# Patient Record
Sex: FEMALE | Race: WHITE | NOT HISPANIC OR LATINO | Employment: FULL TIME | URBAN - NONMETROPOLITAN AREA
[De-identification: names, ages, dates, MRNs, and addresses within clinical notes are randomized per-mention and may not be internally consistent; named-entity substitution may affect disease eponyms.]

---

## 2018-06-20 ENCOUNTER — OFFICE VISIT CONVERTED (OUTPATIENT)
Dept: FAMILY MEDICINE CLINIC | Age: 58
End: 2018-06-20
Attending: FAMILY MEDICINE

## 2018-08-22 ENCOUNTER — OFFICE VISIT CONVERTED (OUTPATIENT)
Dept: FAMILY MEDICINE CLINIC | Age: 58
End: 2018-08-22
Attending: FAMILY MEDICINE

## 2018-12-12 ENCOUNTER — OFFICE VISIT CONVERTED (OUTPATIENT)
Dept: OTHER | Facility: HOSPITAL | Age: 58
End: 2018-12-12
Attending: ORTHOPAEDIC SURGERY

## 2018-12-14 ENCOUNTER — OFFICE VISIT CONVERTED (OUTPATIENT)
Dept: FAMILY MEDICINE CLINIC | Age: 58
End: 2018-12-14
Attending: FAMILY MEDICINE

## 2019-03-07 ENCOUNTER — HOSPITAL ENCOUNTER (OUTPATIENT)
Dept: DIABETES SERVICES | Facility: HOSPITAL | Age: 59
Setting detail: RECURRING SERIES
Discharge: HOME | End: 2019-03-31
Attending: FAMILY MEDICINE

## 2019-03-07 ENCOUNTER — HOSPITAL ENCOUNTER (OUTPATIENT)
Dept: OTHER | Facility: HOSPITAL | Age: 59
Discharge: HOME | End: 2019-03-07
Attending: FAMILY MEDICINE

## 2019-03-07 LAB
ALBUMIN SERPL-MCNC: 3.9 G/DL (ref 3.5–5)
ALBUMIN/GLOB SERPL: 1 {RATIO} (ref 1.4–2.6)
ALP SERPL-CCNC: 57 U/L (ref 53–141)
ALT SERPL-CCNC: 23 U/L (ref 10–40)
ANION GAP SERPL CALC-SCNC: 15 MMOL/L (ref 8–19)
AST SERPL-CCNC: 14 U/L (ref 15–50)
BILIRUB SERPL-MCNC: 0.28 MG/DL (ref 0.2–1.3)
BUN SERPL-MCNC: 18 MG/DL (ref 5–25)
BUN/CREAT SERPL: 28 {RATIO} (ref 6–20)
CALCIUM SERPL-MCNC: 9.2 MG/DL (ref 8.7–10.4)
CHLORIDE SERPL-SCNC: 100 MMOL/L (ref 99–111)
CHOLEST SERPL-MCNC: 146 MG/DL (ref 107–200)
CHOLEST/HDLC SERPL: 3.3 {RATIO} (ref 3–6)
CONV CO2: 29 MMOL/L (ref 22–32)
CONV TOTAL PROTEIN: 7.7 G/DL (ref 6.3–8.2)
CREAT UR-MCNC: 0.65 MG/DL (ref 0.5–0.9)
EST. AVERAGE GLUCOSE BLD GHB EST-MCNC: 177 MG/DL
GFR SERPLBLD BASED ON 1.73 SQ M-ARVRAT: >60 ML/MIN/{1.73_M2}
GLOBULIN UR ELPH-MCNC: 3.8 G/DL (ref 2–3.5)
GLUCOSE SERPL-MCNC: 175 MG/DL (ref 65–99)
HBA1C MFR BLD: 7.8 % (ref 3.5–5.7)
HDLC SERPL-MCNC: 44 MG/DL (ref 40–60)
LDLC SERPL CALC-MCNC: 47 MG/DL (ref 70–100)
OSMOLALITY SERPL CALC.SUM OF ELEC: 296 MOSM/KG (ref 273–304)
POTASSIUM SERPL-SCNC: 4.3 MMOL/L (ref 3.5–5.3)
SODIUM SERPL-SCNC: 140 MMOL/L (ref 135–147)
TRIGL SERPL-MCNC: 277 MG/DL (ref 40–150)
VLDLC SERPL-MCNC: 55 MG/DL (ref 5–37)

## 2019-05-09 ENCOUNTER — OFFICE VISIT CONVERTED (OUTPATIENT)
Dept: FAMILY MEDICINE CLINIC | Age: 59
End: 2019-05-09
Attending: FAMILY MEDICINE

## 2019-05-09 ENCOUNTER — HOSPITAL ENCOUNTER (OUTPATIENT)
Dept: OTHER | Facility: HOSPITAL | Age: 59
Discharge: HOME | End: 2019-05-09
Attending: FAMILY MEDICINE

## 2019-05-09 LAB
ALBUMIN SERPL-MCNC: 4.2 G/DL (ref 3.5–5)
ALBUMIN/GLOB SERPL: 1.1 {RATIO} (ref 1.4–2.6)
ALP SERPL-CCNC: 59 U/L (ref 53–141)
ALT SERPL-CCNC: 23 U/L (ref 10–40)
ANION GAP SERPL CALC-SCNC: 17 MMOL/L (ref 8–19)
AST SERPL-CCNC: 18 U/L (ref 15–50)
BILIRUB SERPL-MCNC: 0.21 MG/DL (ref 0.2–1.3)
BUN SERPL-MCNC: 21 MG/DL (ref 5–25)
BUN/CREAT SERPL: 30 {RATIO} (ref 6–20)
CALCIUM SERPL-MCNC: 9.8 MG/DL (ref 8.7–10.4)
CHLORIDE SERPL-SCNC: 94 MMOL/L (ref 99–111)
CHOLEST SERPL-MCNC: 159 MG/DL (ref 107–200)
CHOLEST/HDLC SERPL: 3.5 {RATIO} (ref 3–6)
CONV CO2: 29 MMOL/L (ref 22–32)
CONV TOTAL PROTEIN: 7.9 G/DL (ref 6.3–8.2)
CREAT UR-MCNC: 0.71 MG/DL (ref 0.5–0.9)
EST. AVERAGE GLUCOSE BLD GHB EST-MCNC: 143 MG/DL
GFR SERPLBLD BASED ON 1.73 SQ M-ARVRAT: >60 ML/MIN/{1.73_M2}
GLOBULIN UR ELPH-MCNC: 3.7 G/DL (ref 2–3.5)
GLUCOSE SERPL-MCNC: 97 MG/DL (ref 65–99)
HBA1C MFR BLD: 6.6 % (ref 3.5–5.7)
HDLC SERPL-MCNC: 45 MG/DL (ref 40–60)
LDLC SERPL CALC-MCNC: 45 MG/DL (ref 70–100)
OSMOLALITY SERPL CALC.SUM OF ELEC: 285 MOSM/KG (ref 273–304)
POTASSIUM SERPL-SCNC: 4 MMOL/L (ref 3.5–5.3)
SODIUM SERPL-SCNC: 136 MMOL/L (ref 135–147)
TRIGL SERPL-MCNC: 347 MG/DL (ref 40–150)
VLDLC SERPL-MCNC: 69 MG/DL (ref 5–37)

## 2019-11-04 ENCOUNTER — OFFICE VISIT CONVERTED (OUTPATIENT)
Dept: FAMILY MEDICINE CLINIC | Age: 59
End: 2019-11-04
Attending: FAMILY MEDICINE

## 2019-11-04 ENCOUNTER — HOSPITAL ENCOUNTER (OUTPATIENT)
Dept: OTHER | Facility: HOSPITAL | Age: 59
Discharge: HOME | End: 2019-11-04
Attending: FAMILY MEDICINE

## 2019-11-04 LAB
ALBUMIN SERPL-MCNC: 4.1 G/DL (ref 3.5–5)
ALBUMIN/GLOB SERPL: 1.3 {RATIO} (ref 1.4–2.6)
ALP SERPL-CCNC: 64 U/L (ref 53–141)
ALT SERPL-CCNC: 18 U/L (ref 10–40)
ANION GAP SERPL CALC-SCNC: 18 MMOL/L (ref 8–19)
APPEARANCE UR: ABNORMAL
AST SERPL-CCNC: 18 U/L (ref 15–50)
BACTERIA UR CULT: NORMAL
BACTERIA UR QL AUTO: ABNORMAL
BASOPHILS # BLD MANUAL: 0.04 10*3/UL (ref 0–0.2)
BASOPHILS NFR BLD MANUAL: 0.4 % (ref 0–3)
BILIRUB SERPL-MCNC: 0.24 MG/DL (ref 0.2–1.3)
BILIRUB UR QL: NEGATIVE
BUN SERPL-MCNC: 20 MG/DL (ref 5–25)
BUN/CREAT SERPL: 24 {RATIO} (ref 6–20)
CALCIUM SERPL-MCNC: 9.5 MG/DL (ref 8.7–10.4)
CASTS URNS QL MICRO: ABNORMAL /[LPF]
CHLORIDE SERPL-SCNC: 97 MMOL/L (ref 99–111)
CHOLEST SERPL-MCNC: 137 MG/DL (ref 107–200)
CHOLEST/HDLC SERPL: 3.4 {RATIO} (ref 3–6)
COLOR UR: YELLOW
CONV CO2: 28 MMOL/L (ref 22–32)
CONV LEUKOCYTE ESTERASE: NEGATIVE
CONV TOTAL PROTEIN: 7.3 G/DL (ref 6.3–8.2)
CONV UROBILINOGEN IN URINE BY AUTOMATED TEST STRIP: 0.2 {EHRLICHU}/DL (ref 0.1–1)
CREAT UR-MCNC: 0.84 MG/DL (ref 0.5–0.9)
DEPRECATED RDW RBC AUTO: 41.9 FL
EOSINOPHIL # BLD MANUAL: 0.18 10*3/UL (ref 0–0.7)
EOSINOPHIL NFR BLD MANUAL: 1.6 % (ref 0–7)
EPI CELLS #/AREA URNS HPF: ABNORMAL /[HPF]
ERYTHROCYTE [DISTWIDTH] IN BLOOD BY AUTOMATED COUNT: 13.1 % (ref 11.5–14.5)
EST. AVERAGE GLUCOSE BLD GHB EST-MCNC: 154 MG/DL
GFR SERPLBLD BASED ON 1.73 SQ M-ARVRAT: >60 ML/MIN/{1.73_M2}
GLOBULIN UR ELPH-MCNC: 3.2 G/DL (ref 2–3.5)
GLUCOSE 24H UR-MCNC: NEGATIVE MG/DL
GLUCOSE SERPL-MCNC: 202 MG/DL (ref 65–99)
GRANS (ABSOLUTE): 7.83 10*3/UL (ref 2–8)
GRANS: 70.4 % (ref 30–85)
HBA1C MFR BLD: 14.4 G/DL (ref 12–16)
HBA1C MFR BLD: 7 % (ref 3.5–5.7)
HCT VFR BLD AUTO: 44.5 % (ref 37–47)
HDLC SERPL-MCNC: 40 MG/DL (ref 40–60)
HGB UR QL STRIP: ABNORMAL
IMM GRANULOCYTES # BLD: 0.05 10*3/UL (ref 0–0.54)
IMM GRANULOCYTES NFR BLD: 0.5 % (ref 0–0.43)
KETONES UR QL STRIP: NEGATIVE MG/DL
LDLC SERPL CALC-MCNC: 48 MG/DL (ref 70–100)
LYMPHOCYTES # BLD MANUAL: 2.31 10*3/UL (ref 1–5)
LYMPHOCYTES NFR BLD MANUAL: 6.3 % (ref 3–10)
MCH RBC QN AUTO: 27.6 PG (ref 27–31)
MCHC RBC AUTO-ENTMCNC: 32.4 G/DL (ref 33–37)
MCV RBC AUTO: 85.2 FL (ref 81–99)
MONOCYTES # BLD AUTO: 0.7 10*3/UL (ref 0.2–1.2)
MUCOUS THREADS URNS QL MICRO: ABNORMAL
NITRITE UR-MCNC: NEGATIVE MG/ML
OSMOLALITY SERPL CALC.SUM OF ELEC: 296 MOSM/KG (ref 273–304)
PH UR STRIP.AUTO: 6 [PH] (ref 5–8)
PLATELET # BLD AUTO: 274 10*3/UL (ref 130–400)
PMV BLD AUTO: 8.4 FL (ref 7.4–10.4)
POTASSIUM SERPL-SCNC: 3.8 MMOL/L (ref 3.5–5.3)
PROT UR-MCNC: ABNORMAL MG/DL
RBC # BLD AUTO: 5.22 10*6/UL (ref 4.2–5.4)
RBC # BLD AUTO: ABNORMAL /[HPF]
SODIUM SERPL-SCNC: 139 MMOL/L (ref 135–147)
SP GR UR STRIP: 1.01 (ref 1–1.03)
SPECIMEN SOURCE: ABNORMAL
TRIGL SERPL-MCNC: 246 MG/DL (ref 40–150)
TSH SERPL-ACNC: 2.08 M[IU]/L (ref 0.27–4.2)
UNIDENT CRYS URNS QL MICRO: ABNORMAL /[HPF]
VARIANT LYMPHS NFR BLD MANUAL: 20.8 % (ref 20–45)
VLDLC SERPL-MCNC: 49 MG/DL (ref 5–37)
WBC # BLD AUTO: 11.11 10*3/UL (ref 4.8–10.8)
WBC #/AREA URNS HPF: ABNORMAL /[HPF]

## 2019-11-06 LAB
CONV HEPATITIS C AB WITH REFLEX TO CONFIRMATION: <0.1 S/CO RATIO (ref 0–0.9)
CONV HEPATITIS COMMENT: NORMAL

## 2019-11-07 LAB
AMOXICILLIN+CLAV SUSC ISLT: 4
AMPICILLIN SUSC ISLT: >=32
AMPICILLIN+SULBAC SUSC ISLT: 16
BACTERIA UR CULT: ABNORMAL
CEFAZOLIN SUSC ISLT: <=4
CEFEPIME SUSC ISLT: <=1
CEFTAZIDIME SUSC ISLT: <=1
CEFTRIAXONE SUSC ISLT: <=1
CEFUROXIME ORAL SUSC ISLT: 4
CEFUROXIME PARENTER SUSC ISLT: 4
CIPROFLOXACIN SUSC ISLT: <=0.25
ERTAPENEM SUSC ISLT: <=0.5
GENTAMICIN SUSC ISLT: <=1
LEVOFLOXACIN SUSC ISLT: 0.5
NITROFURANTOIN SUSC ISLT: <=16
TETRACYCLINE SUSC ISLT: >=16
TMP SMX SUSC ISLT: >=320
TOBRAMYCIN SUSC ISLT: <=1

## 2019-12-02 ENCOUNTER — HOSPITAL ENCOUNTER (OUTPATIENT)
Dept: OTHER | Facility: HOSPITAL | Age: 59
Discharge: HOME | End: 2019-12-02
Attending: FAMILY MEDICINE

## 2019-12-02 LAB
ANION GAP SERPL CALC-SCNC: 21 MMOL/L (ref 8–19)
BASOPHILS # BLD MANUAL: 0.04 10*3/UL (ref 0–0.2)
BASOPHILS NFR BLD MANUAL: 0.5 % (ref 0–3)
BUN SERPL-MCNC: 16 MG/DL (ref 5–25)
BUN/CREAT SERPL: 24 {RATIO} (ref 6–20)
CALCIUM SERPL-MCNC: 9.2 MG/DL (ref 8.7–10.4)
CHLORIDE SERPL-SCNC: 100 MMOL/L (ref 99–111)
CONV CO2: 28 MMOL/L (ref 22–32)
CREAT UR-MCNC: 0.66 MG/DL (ref 0.5–0.9)
DEPRECATED RDW RBC AUTO: 41.3 FL
EOSINOPHIL # BLD MANUAL: 0.22 10*3/UL (ref 0–0.7)
EOSINOPHIL NFR BLD MANUAL: 2.6 % (ref 0–7)
ERYTHROCYTE [DISTWIDTH] IN BLOOD BY AUTOMATED COUNT: 13.1 % (ref 11.5–14.5)
GFR SERPLBLD BASED ON 1.73 SQ M-ARVRAT: >60 ML/MIN/{1.73_M2}
GLUCOSE SERPL-MCNC: 120 MG/DL (ref 65–99)
GRANS (ABSOLUTE): 5.54 10*3/UL (ref 2–8)
GRANS: 65.6 % (ref 30–85)
HBA1C MFR BLD: 14.5 G/DL (ref 12–16)
HCT VFR BLD AUTO: 44.4 % (ref 37–47)
IMM GRANULOCYTES # BLD: 0.05 10*3/UL (ref 0–0.54)
IMM GRANULOCYTES NFR BLD: 0.6 % (ref 0–0.43)
LYMPHOCYTES # BLD MANUAL: 2 10*3/UL (ref 1–5)
LYMPHOCYTES NFR BLD MANUAL: 7 % (ref 3–10)
MCH RBC QN AUTO: 27.6 PG (ref 27–31)
MCHC RBC AUTO-ENTMCNC: 32.7 G/DL (ref 33–37)
MCV RBC AUTO: 84.6 FL (ref 81–99)
MONOCYTES # BLD AUTO: 0.59 10*3/UL (ref 0.2–1.2)
OSMOLALITY SERPL CALC.SUM OF ELEC: 300 MOSM/KG (ref 273–304)
PLATELET # BLD AUTO: 216 10*3/UL (ref 130–400)
PMV BLD AUTO: 7.9 FL (ref 7.4–10.4)
POTASSIUM SERPL-SCNC: 4.8 MMOL/L (ref 3.5–5.3)
RBC # BLD AUTO: 5.25 10*6/UL (ref 4.2–5.4)
SODIUM SERPL-SCNC: 144 MMOL/L (ref 135–147)
VARIANT LYMPHS NFR BLD MANUAL: 23.7 % (ref 20–45)
WBC # BLD AUTO: 8.44 10*3/UL (ref 4.8–10.8)

## 2020-02-05 ENCOUNTER — HOSPITAL ENCOUNTER (OUTPATIENT)
Dept: OTHER | Facility: HOSPITAL | Age: 60
Discharge: HOME | End: 2020-02-05
Attending: FAMILY MEDICINE

## 2020-02-05 ENCOUNTER — OFFICE VISIT CONVERTED (OUTPATIENT)
Dept: FAMILY MEDICINE CLINIC | Age: 60
End: 2020-02-05
Attending: FAMILY MEDICINE

## 2020-04-24 ENCOUNTER — OFFICE VISIT CONVERTED (OUTPATIENT)
Dept: FAMILY MEDICINE CLINIC | Age: 60
End: 2020-04-24
Attending: FAMILY MEDICINE

## 2020-10-15 ENCOUNTER — OFFICE VISIT CONVERTED (OUTPATIENT)
Dept: FAMILY MEDICINE CLINIC | Age: 60
End: 2020-10-15
Attending: FAMILY MEDICINE

## 2020-10-15 ENCOUNTER — HOSPITAL ENCOUNTER (OUTPATIENT)
Dept: OTHER | Facility: HOSPITAL | Age: 60
Discharge: HOME | End: 2020-10-15
Attending: FAMILY MEDICINE

## 2020-10-15 LAB
ALBUMIN SERPL-MCNC: 4.4 G/DL (ref 3.5–5)
ALBUMIN/GLOB SERPL: 1.2 {RATIO} (ref 1.4–2.6)
ALP SERPL-CCNC: 60 U/L (ref 53–141)
ALT SERPL-CCNC: 31 U/L (ref 10–40)
ANION GAP SERPL CALC-SCNC: 16 MMOL/L (ref 8–19)
APPEARANCE UR: ABNORMAL
AST SERPL-CCNC: 31 U/L (ref 15–50)
BACTERIA UR CULT: NORMAL
BACTERIA UR QL AUTO: ABNORMAL
BILIRUB SERPL-MCNC: 0.47 MG/DL (ref 0.2–1.3)
BILIRUB UR QL: NEGATIVE
BUN SERPL-MCNC: 22 MG/DL (ref 5–25)
BUN/CREAT SERPL: 31 {RATIO} (ref 6–20)
CALCIUM SERPL-MCNC: 10.5 MG/DL (ref 8.7–10.4)
CASTS URNS QL MICRO: ABNORMAL /[LPF]
CHLORIDE SERPL-SCNC: 97 MMOL/L (ref 99–111)
CHOLEST SERPL-MCNC: 180 MG/DL (ref 107–200)
CHOLEST/HDLC SERPL: 4.1 {RATIO} (ref 3–6)
COLOR UR: YELLOW
CONV CO2: 28 MMOL/L (ref 22–32)
CONV LEUKOCYTE ESTERASE: ABNORMAL
CONV TOTAL PROTEIN: 8.1 G/DL (ref 6.3–8.2)
CONV UROBILINOGEN IN URINE BY AUTOMATED TEST STRIP: 1 {EHRLICHU}/DL (ref 0.1–1)
CREAT UR-MCNC: 0.72 MG/DL (ref 0.5–0.9)
EPI CELLS #/AREA URNS HPF: ABNORMAL /[HPF]
ERYTHROCYTE [DISTWIDTH] IN BLOOD BY AUTOMATED COUNT: 13.3 % (ref 11.7–14.4)
EST. AVERAGE GLUCOSE BLD GHB EST-MCNC: 154 MG/DL
GFR SERPLBLD BASED ON 1.73 SQ M-ARVRAT: >60 ML/MIN/{1.73_M2}
GLOBULIN UR ELPH-MCNC: 3.7 G/DL (ref 2–3.5)
GLUCOSE 24H UR-MCNC: NEGATIVE MG/DL
GLUCOSE SERPL-MCNC: 135 MG/DL (ref 65–99)
HBA1C MFR BLD: 7 % (ref 3.5–5.7)
HCT VFR BLD AUTO: 47.2 % (ref 37–47)
HDLC SERPL-MCNC: 44 MG/DL (ref 40–60)
HGB BLD-MCNC: 15.2 G/DL (ref 12–16)
HGB UR QL STRIP: NEGATIVE
KETONES UR QL STRIP: NEGATIVE MG/DL
LDLC SERPL CALC-MCNC: 93 MG/DL (ref 70–100)
MCH RBC QN AUTO: 28.3 PG (ref 27–31)
MCHC RBC AUTO-ENTMCNC: 32.2 G/DL (ref 33–37)
MCV RBC AUTO: 87.9 FL (ref 81–99)
MUCOUS THREADS URNS QL MICRO: ABNORMAL
NITRITE UR-MCNC: POSITIVE MG/ML
OSMOLALITY SERPL CALC.SUM OF ELEC: 287 MOSM/KG (ref 273–304)
PH UR STRIP.AUTO: 5.5 [PH] (ref 5–8)
PLATELET # BLD AUTO: 263 10*3/UL (ref 130–400)
PMV BLD AUTO: 8.8 FL (ref 9.4–12.3)
POTASSIUM SERPL-SCNC: 4.6 MMOL/L (ref 3.5–5.3)
PROT UR-MCNC: NEGATIVE MG/DL
RBC # BLD AUTO: 5.37 10*6/UL (ref 4.2–5.4)
RBC # BLD AUTO: ABNORMAL /[HPF]
SODIUM SERPL-SCNC: 136 MMOL/L (ref 135–147)
SP GR UR STRIP: 1.02 (ref 1–1.03)
SPECIMEN SOURCE: ABNORMAL
TRIGL SERPL-MCNC: 214 MG/DL (ref 40–150)
TSH SERPL-ACNC: 3.33 M[IU]/L (ref 0.27–4.2)
UNIDENT CRYS URNS QL MICRO: ABNORMAL /[HPF]
VLDLC SERPL-MCNC: 43 MG/DL (ref 5–37)
WBC # BLD AUTO: 8.96 10*3/UL (ref 4.8–10.8)
WBC #/AREA URNS HPF: ABNORMAL /[HPF]

## 2020-10-17 LAB
AMPICILLIN SUSC ISLT: 8
AMPICILLIN+SULBAC SUSC ISLT: 4
BACTERIA UR CULT: ABNORMAL
CEFAZOLIN SUSC ISLT: <=4
CEFEPIME SUSC ISLT: <=0.12
CEFTAZIDIME SUSC ISLT: <=1
CEFTRIAXONE SUSC ISLT: <=0.25
CIPROFLOXACIN SUSC ISLT: <=0.25
ERTAPENEM SUSC ISLT: <=0.12
GENTAMICIN SUSC ISLT: <=1
LEVOFLOXACIN SUSC ISLT: <=0.12
NITROFURANTOIN SUSC ISLT: <=16
PIP+TAZO SUSC ISLT: <=4
TMP SMX SUSC ISLT: <=20
TOBRAMYCIN SUSC ISLT: <=1

## 2020-10-27 ENCOUNTER — OFFICE VISIT (OUTPATIENT)
Dept: ORTHOPEDIC SURGERY | Facility: CLINIC | Age: 60
End: 2020-10-27

## 2020-10-27 VITALS — WEIGHT: 293 LBS | HEIGHT: 67 IN | BODY MASS INDEX: 45.99 KG/M2 | TEMPERATURE: 97.1 F

## 2020-10-27 DIAGNOSIS — G89.29 CHRONIC LEFT SHOULDER PAIN: Primary | ICD-10-CM

## 2020-10-27 DIAGNOSIS — M75.42 IMPINGEMENT SYNDROME OF LEFT SHOULDER: ICD-10-CM

## 2020-10-27 DIAGNOSIS — M25.512 CHRONIC LEFT SHOULDER PAIN: Primary | ICD-10-CM

## 2020-10-27 PROCEDURE — 99204 OFFICE O/P NEW MOD 45 MIN: CPT | Performed by: PHYSICIAN ASSISTANT

## 2020-10-27 PROCEDURE — 20610 DRAIN/INJ JOINT/BURSA W/O US: CPT | Performed by: PHYSICIAN ASSISTANT

## 2020-10-27 RX ORDER — METHYLPREDNISOLONE ACETATE 80 MG/ML
160 INJECTION, SUSPENSION INTRA-ARTICULAR; INTRALESIONAL; INTRAMUSCULAR; SOFT TISSUE
Status: COMPLETED | OUTPATIENT
Start: 2020-10-27 | End: 2020-10-27

## 2020-10-27 RX ADMIN — METHYLPREDNISOLONE ACETATE 160 MG: 80 INJECTION, SUSPENSION INTRA-ARTICULAR; INTRALESIONAL; INTRAMUSCULAR; SOFT TISSUE at 09:11

## 2020-10-27 NOTE — PROGRESS NOTES
NEW VISIT    Patient: Melany Maguire  ?  YOB: 1960    MRN: [id number]  ?  Chief Complaint   Patient presents with   • Left Shoulder - Pain, Establish Care      ?  HPI:   Melany Maguire is a 60 y.o. female who presents for complaint of left shoulder pain.  She reports pain for approximately 10 months and without injury.    Pain Location: LEFT shoulder (entire shoulder, significant tenderness over AC joint)  Radiation: into the lateral aspect of the humerus  Quality: aching, sharp  Intensity/Severity: moderate  Duration: 10 months  Progression of symptoms: yes, progressive worsening  Onset quality: gradual   Timing: constant  Aggravating Factors: reaching forward, reaching crossbody, sleeping on the shoulder  Alleviating Factors: rest  Previous Episodes: none mentioned  Associated Symptoms: pain, decreased strength  ADLs Affected: grooming/hygiene/toileting/personal care, work related activities, recreational activities/sports  Previous Treatment: Tylenol and NSAIDs     This patient is a new patient.  This problem is new to this examiner.      Allergies:   Allergies   Allergen Reactions   • Penicillins Hives       Medications:   Home Medications:  No current outpatient medications on file prior to visit.     No current facility-administered medications on file prior to visit.      Current Medications:  Scheduled Meds:  PRN Meds:.    I have reviewed the patient's medical history in detail and updated the computerized patient record.  Review and summarization of old records include:    Past Medical History:   Diagnosis Date   • Diabetes (CMS/HCC)    • Hypertension    • Neuropathy      Past Surgical History:   Procedure Laterality Date   •  SECTION      x2   • CHOLECYSTECTOMY       Social History     Occupational History   • Not on file   Tobacco Use   • Smoking status: Never Smoker   • Smokeless tobacco: Never Used   Substance and Sexual Activity   • Alcohol use: Never     Frequency: Never  "  • Drug use: Defer   • Sexual activity: Defer      Family History   Problem Relation Age of Onset   • Diabetes Other    • Heart disease Other    • Hypertension Other          Review of Systems  Constitutional: Negative.    HENT: Negative.    Eyes: Negative.    Respiratory: Negative.    Cardiovascular: Negative.    Endocrine: Negative.    Musculoskeletal: Positive for arthralgias, decreased ROM, decreased strength.  Skin: Negative.    Allergic/Immunologic: Negative.    Neurological: Negative    Hematological: Negative.    Psychiatric/Behavioral: Negative.           Wt Readings from Last 3 Encounters:   10/27/20 (!) 137 kg (301 lb)     Ht Readings from Last 3 Encounters:   10/27/20 170.2 cm (67\")     Body mass index is 47.14 kg/m².  Facility age limit for growth percentiles is 20 years.  Vitals:    10/27/20 0840   Temp: 97.1 °F (36.2 °C)         Physical Exam  Constitutional: Patient is oriented to person, place, and time. Appears well-developed and well-nourished.   HENT:   Head: Normocephalic and atraumatic.   Eyes: Conjunctivae and EOM are normal. Pupils are equal, round, and reactive to light.   Cardiovascular: Normal rate and intact distal pulses.   Pulmonary/Chest: Effort normal.   Musculoskeletal:   See detailed exam below   Neurological: Alert and oriented to person, place, and time. No sensory deficit. Coordination normal.   Skin: Skin is warm and dry. Capillary refill takes less than 2 seconds. No rash noted. No erythema.   Psychiatric: Patient has a normal mood and affect. Her behavior is normal. Judgment and thought content normal.   Nursing note and vitals reviewed.      Ortho Exam:   LEFT shoulder:  Positive for pain and tenderness with palpation over the subcromial bursa.   Positive for signs of impingement with internal and external rotation.   Forward flexion is 0- 120 degrees, abduction is 0-110 degrees, external rotation is 0-50 degrees.   Rotator cuff function is fairly well preserved except " impingement at 90 degrees.   Lewis's sign is positive. Neer sign is positive.   Sulcus sign is negative. Drop arm sign is negative.   Apprehension sign is negative.   Axillary nerve function is well preserved. Radial artery pulses are palpable.   There is no evidence of multidirectional instability.   The pain level is 7.      Diagnostics:  Left shoulder xrays 4views were performed at [facility] on 10/15/2020. These images were independently viewed and interpreted by myself, my impression as follows:  · Glenohumeral joint space appears well-preserved, moderate AC joint arthritis with spurring on the inferior aspect of the acromion      Assessment:  Diagnoses and all orders for this visit:    1. Chronic left shoulder pain (Primary)  -     Large Joint Arthrocentesis: R subacromial bursa    2. Impingement syndrome of left shoulder          Large Joint Arthrocentesis: R subacromial bursa  Date/Time: 10/27/2020 9:11 AM  Consent given by: patient  Site marked: site marked  Timeout: Immediately prior to procedure a time out was called to verify the correct patient, procedure, equipment, support staff and site/side marked as required   Supporting Documentation  Indications: pain   Procedure Details  Location: shoulder - R subacromial bursa  Preparation: Patient was prepped and draped in the usual sterile fashion  Needle size: 25 G  Approach: anteromedial  Medications administered: 160 mg methylPREDNISolone acetate 80 MG/ML; 2 mL lidocaine (cardiac)  Patient tolerance: patient tolerated the procedure well with no immediate complications      ?    Plan    Orders Placed This Encounter   Procedures   • Large Joint Arthrocentesis: R subacromial bursa      · Management of chronic condition with acute exacerbation or progression of symptoms   · Discussion of orthopaedic goals and activities.  · Risk, benefits, and merits of treatment alternatives reviewed with the patient.  Discussed conservative measures  with oral anti-inflammatories and intra-articular steroid injection, as well as proceeding with MRI.  Patient is not interested in doing MRI at this point as she is not interested in surgery if at all possible.  We did discuss the possibility of surgery in needed in the future for possible Emily resection and cleaning up spur on the acromion.  If she decided on this we would need to have an MRI first to determine if there is any tearing of the rotator cuff that lies underneath.  We will proceed with intra-articular steroid injection of the left shoulder. Injected patient's left shoulder joint(s) with steroid from a(n) anteromedial approach.  Patient tolerated the procedure well and no complications were encountered.  · Ice, heat, and/or modalities as beneficial  · Watch for signs and symptoms of infection  · Call or notify for any adverse effect from injection therapy  · Patient is encouraged to call or return for any issues or concerns.  · Follow up in 3 months    Eddie Overton PA-C  Date of encounter: 10/27/2020     Electronically signed by Eddie Overton PA-C, 10/27/20, 8:27 AM EDT.    EMR Dragon/Transcription disclaimer:  Much of this encounter note is an electronic transcription/translation of spoken language to printed text. The electronic translation of spoken language may permit erroneous, or at times, nonsensical words or phrases to be inadvertently transcribed; Although I have reviewed the note for such errors, some may still exist.

## 2020-12-04 ENCOUNTER — OFFICE VISIT CONVERTED (OUTPATIENT)
Dept: FAMILY MEDICINE CLINIC | Age: 60
End: 2020-12-04
Attending: FAMILY MEDICINE

## 2020-12-30 ENCOUNTER — OFFICE VISIT CONVERTED (OUTPATIENT)
Dept: FAMILY MEDICINE CLINIC | Age: 60
End: 2020-12-30
Attending: FAMILY MEDICINE

## 2020-12-30 ENCOUNTER — HOSPITAL ENCOUNTER (OUTPATIENT)
Dept: OTHER | Facility: HOSPITAL | Age: 60
Discharge: HOME | End: 2020-12-30
Attending: FAMILY MEDICINE

## 2021-01-01 LAB — SARS-COV-2 RNA SPEC QL NAA+PROBE: NOT DETECTED

## 2021-01-15 ENCOUNTER — HOSPITAL ENCOUNTER (OUTPATIENT)
Dept: OTHER | Facility: HOSPITAL | Age: 61
Discharge: HOME | End: 2021-01-15
Attending: FAMILY MEDICINE

## 2021-01-17 LAB — SARS-COV-2 RNA SPEC QL NAA+PROBE: NOT DETECTED

## 2021-01-26 ENCOUNTER — CLINICAL SUPPORT (OUTPATIENT)
Dept: ORTHOPEDIC SURGERY | Facility: CLINIC | Age: 61
End: 2021-01-26

## 2021-01-26 VITALS — TEMPERATURE: 97.2 F | HEIGHT: 67 IN | WEIGHT: 293 LBS | BODY MASS INDEX: 45.99 KG/M2

## 2021-01-26 DIAGNOSIS — M75.42 IMPINGEMENT SYNDROME OF LEFT SHOULDER: Primary | ICD-10-CM

## 2021-01-26 PROCEDURE — 20610 DRAIN/INJ JOINT/BURSA W/O US: CPT | Performed by: PHYSICIAN ASSISTANT

## 2021-01-26 RX ORDER — DICLOFENAC SODIUM AND MISOPROSTOL 75; 200 MG/1; UG/1
1 TABLET, DELAYED RELEASE ORAL 2 TIMES DAILY
COMMUNITY
End: 2021-08-20

## 2021-01-26 RX ORDER — SODIUM PHOSPHATE,MONO-DIBASIC 19G-7G/118
ENEMA (ML) RECTAL 2 TIMES DAILY
COMMUNITY
End: 2023-01-11

## 2021-01-26 RX ORDER — METHYLPREDNISOLONE ACETATE 80 MG/ML
160 INJECTION, SUSPENSION INTRA-ARTICULAR; INTRALESIONAL; INTRAMUSCULAR; SOFT TISSUE
Status: COMPLETED | OUTPATIENT
Start: 2021-01-26 | End: 2021-01-26

## 2021-01-26 RX ORDER — AMLODIPINE BESYLATE 10 MG/1
TABLET ORAL
COMMUNITY
Start: 2021-01-10 | End: 2021-07-14 | Stop reason: SDUPTHER

## 2021-01-26 RX ORDER — LISINOPRIL AND HYDROCHLOROTHIAZIDE 25; 20 MG/1; MG/1
TABLET ORAL
COMMUNITY
Start: 2021-01-10 | End: 2021-07-14 | Stop reason: SDUPTHER

## 2021-01-26 RX ADMIN — METHYLPREDNISOLONE ACETATE 160 MG: 80 INJECTION, SUSPENSION INTRA-ARTICULAR; INTRALESIONAL; INTRAMUSCULAR; SOFT TISSUE at 09:50

## 2021-01-26 NOTE — PROGRESS NOTES
"Chief Complaint  Follow-up and Pain of the Left Shoulder    Subjective    History of Present Illness      Melany Maguire is a 60 y.o. female who presents to [facility] for is here today for injection therapy. She receives LEFT shoulder injections of steroid. Since last injection, patient notes substantial relief of symptoms.  She reports the injection lasted until about 3 weeks ago.  Treatment options have been discussed and she understands and consents.       Objective   Vital Signs:   Temp 97.2 °F (36.2 °C)   Ht 170.2 cm (67\")   Wt (!) 140 kg (309 lb)   BMI 48.40 kg/m²     Physical Exam  60 y.o. female is awake, alert, oriented, in no acute distress and well developed, well nourished.  Ortho Exam   LEFT shoulder:  Positive for pain and tenderness with palpation over the subcromial bursa.   Positive for signs of impingement with internal and external rotation.   Forward flexion is 0-120 degrees, abduction is 0-110 degrees, external rotation is 0-50 degrees.   Rotator cuff function is fairly well preserved except impingement at 90 degrees.   Lewis's sign is positive. Neer sign is positive.   Sulcus sign is negative. Drop arm sign is negative.   Apprehension sign is negative.   Axillary nerve function is well preserved. Radial artery pulses are palpable.   There is no evidence of multidirectional instability.   The pain level is 4.      PROCEDURE  Large Joint Arthrocentesis: L subacromial bursa  Date/Time: 1/26/2021 9:50 AM  Consent given by: patient  Site marked: site marked  Timeout: Immediately prior to procedure a time out was called to verify the correct patient, procedure, equipment, support staff and site/side marked as required   Supporting Documentation  Indications: pain   Procedure Details  Location: shoulder - L subacromial bursa  Preparation: Patient was prepped and draped in the usual sterile fashion  Needle size: 25 G  Approach: " anteromedial  Medications administered: 160 mg methylPREDNISolone acetate 80 MG/ML; 2 mL lidocaine (cardiac)  Patient tolerance: patient tolerated the procedure well with no immediate complications      Injection site was identified by physical examination and cleaned with Betadine and alcohol swabs. Prior to needle insertion, ethyl chloride spray was used for surface anesthesia. Sterile technique was used.       Assessment   Assessment and Plan    Problem List Items Addressed This Visit        Other    Impingement syndrome of left shoulder - Primary    Relevant Orders    Large Joint Arthrocentesis: L subacromial bursa          Follow Up   • Left shoulder steroid injection was discussed with the patient. Discussed indication, risks, benefits, and alternatives. Verbal consent was given to proceed with the procedure.   • Injection was performed from anteromedial approach.  Patient tolerated the procedure well and no complications were encountered.  • Discussion of orthopedic goals and activities and patient/guardian expressed understanding.  • Ice, heat, rest, compression and elevation of extremity as beneficial  • nsaids and/or tylenol as beneficial  • Instructed to refrain from heavy activity/rest the extremity for the next 24-48 hours  • Discussion regarding possibility of cortisol flare and what to expect if this occurs  • Watch for signs and symptoms of infection  • Call if adverse effect from injection therapy  • Follow up in 3 months  • Patient was given instructions and counseling regarding her condition or for health maintenance advice. Please see specific information pulled into the AVS if appropriate.     Eddie Overton PA-C   Date of Encounter: 1/26/2021   Electronically signed by Eddie Overton PA-C, 01/26/21, 8:37 AM EST.     EMR Dragon/Transcription disclaimer:  Much of this encounter note is an electronic transcription/translation of spoken language to printed text. The electronic  translation of spoken language may permit erroneous, or at times, nonsensical words or phrases to be inadvertently transcribed; Although I have reviewed the note for such errors, some may still exist.

## 2021-04-27 ENCOUNTER — CLINICAL SUPPORT (OUTPATIENT)
Dept: ORTHOPEDIC SURGERY | Facility: CLINIC | Age: 61
End: 2021-04-27

## 2021-04-27 VITALS — TEMPERATURE: 97.3 F | WEIGHT: 293 LBS | HEIGHT: 67 IN | BODY MASS INDEX: 45.99 KG/M2

## 2021-04-27 DIAGNOSIS — M75.42 IMPINGEMENT SYNDROME OF LEFT SHOULDER: Primary | ICD-10-CM

## 2021-04-27 DIAGNOSIS — M25.512 CHRONIC LEFT SHOULDER PAIN: ICD-10-CM

## 2021-04-27 DIAGNOSIS — G89.29 CHRONIC LEFT SHOULDER PAIN: ICD-10-CM

## 2021-04-27 PROCEDURE — 99212 OFFICE O/P EST SF 10 MIN: CPT | Performed by: PHYSICIAN ASSISTANT

## 2021-04-27 PROCEDURE — 20610 DRAIN/INJ JOINT/BURSA W/O US: CPT | Performed by: PHYSICIAN ASSISTANT

## 2021-04-27 RX ORDER — LIDOCAINE HYDROCHLORIDE 10 MG/ML
2 INJECTION, SOLUTION EPIDURAL; INFILTRATION; INTRACAUDAL; PERINEURAL
Status: COMPLETED | OUTPATIENT
Start: 2021-04-27 | End: 2021-04-27

## 2021-04-27 RX ORDER — METHYLPREDNISOLONE ACETATE 80 MG/ML
160 INJECTION, SUSPENSION INTRA-ARTICULAR; INTRALESIONAL; INTRAMUSCULAR; SOFT TISSUE
Status: COMPLETED | OUTPATIENT
Start: 2021-04-27 | End: 2021-04-27

## 2021-04-27 RX ORDER — DICLOFENAC SODIUM 75 MG/1
TABLET, DELAYED RELEASE ORAL
COMMUNITY
Start: 2021-02-24 | End: 2021-04-27 | Stop reason: SDUPTHER

## 2021-04-27 RX ADMIN — LIDOCAINE HYDROCHLORIDE 2 ML: 10 INJECTION, SOLUTION EPIDURAL; INFILTRATION; INTRACAUDAL; PERINEURAL at 09:40

## 2021-04-27 RX ADMIN — METHYLPREDNISOLONE ACETATE 160 MG: 80 INJECTION, SUSPENSION INTRA-ARTICULAR; INTRALESIONAL; INTRAMUSCULAR; SOFT TISSUE at 09:40

## 2021-04-27 NOTE — PROGRESS NOTES
"Chief Complaint  Follow-up and Pain of the Left Shoulder    Subjective    History of Present Illness      Melany Maguire is a 60 y.o. female who presents to [facility] for is here today for injection therapy. She receives LEFT shoulder injections of steroid. Since last injection, patient notes great relief of symptoms but only for approximately 2 weeks.  She states the last injection did not provide as much relief as the first. Treatment options have been discussed and she understands and consents.       Objective   Vital Signs:   Temp 97.3 °F (36.3 °C)   Ht 170.2 cm (67\")   Wt 136 kg (300 lb)   BMI 46.99 kg/m²     Physical Exam  60 y.o. female is awake, alert, oriented, in no acute distress and well developed, well nourished.  Ortho Exam   LEFT shoulder:  Positive for pain and tenderness with palpation over the subcromial bursa.   Positive for signs of impingement with internal and external rotation.   Forward flexion is 0-120 degrees, abduction is 0-110 degrees, external rotation is 0-50 degrees.   Rotator cuff function is fairly well preserved except impingement at 90 degrees.   Lewis's sign is positive. Neer sign is positive.   Sulcus sign is negative. Drop arm sign is negative.   Apprehension sign is negative.   Axillary nerve function is well preserved. Radial artery pulses are palpable.   There is no evidence of multidirectional instability.   The pain level is 6.      PROCEDURE  Large Joint Arthrocentesis: L subacromial bursa  Date/Time: 4/27/2021 9:40 AM  Consent given by: patient  Site marked: site marked  Timeout: Immediately prior to procedure a time out was called to verify the correct patient, procedure, equipment, support staff and site/side marked as required   Supporting Documentation  Indications: pain   Procedure Details  Location: shoulder - L subacromial bursa  Preparation: Patient was prepped and draped in the usual sterile fashion  Needle size: 25 " G  Approach: anteromedial  Medications administered: 2 mL lidocaine PF 1% 1 %; 160 mg methylPREDNISolone acetate 80 MG/ML  Patient tolerance: patient tolerated the procedure well with no immediate complications      Injection site was identified by physical examination and cleaned with Betadine and alcohol swabs. Prior to needle insertion, ethyl chloride spray was used for surface anesthesia. Sterile technique was used.       Assessment   Assessment and Plan    Problem List Items Addressed This Visit        Musculoskeletal and Injuries    Left shoulder pain    Relevant Orders    MRI Shoulder Left Without Contrast    Impingement syndrome of left shoulder - Primary    Relevant Orders    MRI Shoulder Left Without Contrast        I spent 15 minutes caring for Melany on this date of service. This time includes time spent by me in the following activities:performing a medically appropriate examination and/or evaluation , counseling and educating the patient/family/caregiver and ordering medications, tests, or procedures    Follow Up   • Left shoulder steroid injection was discussed with the patient. Discussed indication, risks, benefits, and alternatives. Verbal consent was given to proceed with the procedure.   • Injection was performed from anteromedial approach.  Patient tolerated the procedure well and no complications were encountered.  • Discussion of orthopedic goals and activities and patient/guardian expressed understanding.  • Discussed proceeding with MRI of the shoulder to r/o rotator cuff tear and to assess extent of AC joint arthritis. Once we have these results back we can discuss further treatment options/plan.  • Schedule MRI left shoulder  • Ice, heat, rest, compression and elevation of extremity as beneficial  • nsaids and/or tylenol as beneficial  • Instructed to refrain from heavy activity/rest the extremity for the next 24-48 hours  • Discussion regarding possibility of cortisol flare and what to  expect if this occurs  • Watch for signs and symptoms of infection  • Call if adverse effect from injection therapy  • Follow up in 3 months  • Patient was given instructions and counseling regarding her condition or for health maintenance advice. Please see specific information pulled into the AVS if appropriate.     Eddie Overton PA-C   Date of Encounter: 4/27/2021     Electronically signed by Eddie Overton PA-C, 04/27/21, 12:05 PM EDT.   EMR Dragon/Transcription disclaimer:  Much of this encounter note is an electronic transcription/translation of spoken language to printed text. The electronic translation of spoken language may permit erroneous, or at times, nonsensical words or phrases to be inadvertently transcribed; Although I have reviewed the note for such errors, some may still exist.

## 2021-04-30 ENCOUNTER — TELEPHONE (OUTPATIENT)
Dept: ORTHOPEDIC SURGERY | Facility: CLINIC | Age: 61
End: 2021-04-30

## 2021-04-30 NOTE — TELEPHONE ENCOUNTER
MRI IS SCHEDULED FOR 05/17/21 AT [facility] ARRIVE AT 1:40PM FOR A 2:00PM MRI    JOSHUA WILL CALL WITH RESULTS ON 05/21/21 AROUND 12:45PM (EMAIL SENT TO [facility] TO ADD ON)

## 2021-05-16 VITALS
BODY MASS INDEX: 45.99 KG/M2 | HEART RATE: 87 BPM | RESPIRATION RATE: 16 BRPM | OXYGEN SATURATION: 95 % | WEIGHT: 293 LBS | HEIGHT: 67 IN

## 2021-05-18 NOTE — PROGRESS NOTES
"Melany Maguire  1960     Office/Outpatient Visit    Visit Date: Thu, Oct 15, 2020 08:58 am    Provider: Leighton Finch MD (Assistant: Nettie Cisse MA)    Location: [facility]        Electronically signed by Leighton Finch MD on  10/15/2020 12:53:42 PM                             Subjective:        CC: Mrs. Maguire is a 60 year old White female.  Follow up and discuss kidney function.;         HPI:           Mrs. Maguire presents with type 2 diabetes mellitus without complications.  Specifically, this is type 2, non-insulin requiring diabetes.  Compliance with treatment has been good.  Current meds include an oral hypoglycemic.  Most recent lab results include Hemoglobin A1c:  6.6 (%) (05/09/2019),  7.0 (%) (11/04/2019), Foot Exam (Annual):  11/04/2019 (11/04/2019).  HAS HAD DIABETES EDUCATION           In regard to the essential (primary) hypertension, her current cardiac medication regimen includes a diuretic, an ACE inhibitor, and a calcium channel blocker.  Compliance with treatment has been good.  She did not bring her blood pressure diary, but says that pressures have been well controlled.      ROS:     CONSTITUTIONAL:  Negative for chills and fever.      E/N/T:  Negative for ear pain, nasal congestion and sore throat.      CARDIOVASCULAR:  Positive for pedal edema ( mild ).   Negative for chest pain or palpitations.      RESPIRATORY:  Negative for recent cough and dyspnea.      GASTROINTESTINAL:  Negative for abdominal pain, anorexia, constipation, diarrhea, nausea and vomiting.      GENITOURINARY:  Positive for URINE IS \"CLOUDY\".   Negative for dysuria or hematuria.      MUSCULOSKELETAL:  Positive for arthralgias (LEFT SHOULDER, NO CHANGE WITH NSAIDS).   Negative for myalgias.      NEUROLOGICAL:  Positive for paresthesia ( bilateral lower extremity; NSAIDS HELP ).   Negative for headaches or weakness.          Past Medical History / Family History / Social " History:         Last Reviewed on 10/15/2020 09:26 AM by Leighton Finch    Past Medical History:                 PAST MEDICAL HISTORY             GYNECOLOGICAL HISTORY:     miscarriage 1         CURRENT MEDICAL PROVIDERS:    Neurologist    Obstetrician/Gynecologist         PREVENTIVE HEALTH MAINTENANCE             COLORECTAL CANCER SCREENING: Up to date (colonoscopy q10y; sigmoidoscopy q5y; Cologuard q3y) was last done 2017, Results are in chart; colonoscopy with the following abnormalities noted-- Polyp(s); The next colonoscopy is due  ;;     DENTAL CLEANING: was last done RECOMMENDED 19     EYE EXAM: RECOMMENDED 19     MAMMOGRAM: was last done 2017         Surgical History:         Cholecystectomy    : X 2;     Dilation and Curettage         Family History:         Positive for Congestive Heart Failure ( mother ) and Coronary Artery Disease ( father; sister ).      Positive for Type 2 Diabetes ( mother; sister ).          Social History:     Occupation: [employer]     Marital Status:      Children: 2 children         Tobacco/Alcohol/Supplements:     Last Reviewed on 10/15/2020 09:26 AM by Leighton Finch    Tobacco: She has never smoked.          Alcohol:  Does not drink alcohol and never has.      Caffeine:  She admits to consuming caffeine via tea ( 2 servings per day ) and soda ( 2 servings per day ).          Substance Abuse History:     Last Reviewed on 10/15/2020 09:26 AM by Leighton Finch    None         Mental Health History:     Last Reviewed on 2018 11:03 AM by Elysia Peterson        Communicable Diseases (eg STDs):     Last Reviewed on 2018 11:03 AM by Elysia Peterson        Current Problems:     Last Reviewed on 10/15/2020 09:26 AM by Leighton Finch    Venous insufficiency    History of colonic polyps    Type 2 diabetes mellitus without complications    Essential (primary) hypertension    Other long term  (current) drug therapy    Peripheral neuropathy secondary to uncontrolled type II diabetes        Immunizations:     None        Allergies:     Last Reviewed on 10/15/2020 09:26 AM by Leighton Finch    Penicillins:          Current Medications:     Last Reviewed on 10/15/2020 09:26 AM by Leighton Finch    vitamin E  [daily]    lisinopriL-hydrochlorothiazide 20-25 mg oral tablet [TAKE ONE TABLET BY MOUTH DAILY]    amLODIPine 10 mg oral tablet [1 tablet daily.]    OTC Glusoimaine Take one tablet once daily      OTC Tumeric Take one tablet once daily      OTC Vitamin B12 Take one tablet once daily      metFORMIN 500 mg oral tablet [TAKE ONE TABLET BY MOUTH TWICE A DAY]    Vitamin C     diclofenac sodium 75 mg oral tablet, delayed release (enteric coated) [take 1 tablet (75 mg) by oral route 2 times per day]        Objective:        Vitals:         Current: 10/15/2020 9:05:29 AM    Ht:  5 ft, 7 in;  Wt: 301.8 lbs;  BMI: 47.3T: 97.4 F (temporal);  BP: 152/89 mm Hg (left arm, sitting);  P: 91 bpm (left arm (BP Cuff), sitting);  sCr: 0.66 mg/dL;  GFR: 125.30        Exams:     PHYSICAL EXAM:     GENERAL: vital signs recorded - well developed, well nourished;  no apparent distress;     NECK: trachea is midline; thyroid is non-palpable;     RESPIRATORY: normal respiratory rate and pattern with no distress; normal breath sounds with no rales, rhonchi, wheezes or rubs;     CARDIOVASCULAR: normal rate; rhythm is regular;  no systolic murmur; trace pedal edema;     LYMPHATIC: no enlargement of cervical or facial nodes; no supraclavicular nodes;     MUSCULOSKELETAL: normal gait; normal overall tone TENDER AT LEFT DELTOID INSERTION;     NEUROLOGIC: GROSSLY INTACT     PSYCHIATRIC:  appropriate affect and demeanor; normal speech pattern; grossly normal memory;         Assessment:         E11.9   Type 2 diabetes mellitus without complications       I10   Essential (primary) hypertension       M25.512   Pain in left  shoulder       Z11.59   Encounter for screening for other viral diseases           ORDERS:         Meds Prescribed:       [Refilled] diclofenac sodium 75 mg oral tablet, delayed release (enteric coated) [take 1 tablet (75 mg) by oral route 2 times per day], #180 (one hundred and eighty) tablets, Refills: 1 (one)       [Refilled] lisinopriL-hydrochlorothiazide 20-25 mg oral tablet [TAKE ONE TABLET BY MOUTH DAILY], #90 (ninety) tablets, Refills: 1 (one)       [Refilled] metFORMIN 500 mg oral tablet [TAKE ONE TABLET BY MOUTH TWICE A DAY], #180 (one hundred and eighty) tablets, Refills: 1 (one)       [Refilled] amLODIPine 10 mg oral tablet [1 tablet daily.], #90 (ninety) tablets, Refills: 1 (one)         Radiology/Test Orders:       [id number]  Left Radiologic exam, shoulder; comp, 2 views  (Send-Out)            3017F  Colorectal CA screen results documented and reviewed (PV)  (In-House)              Lab Orders:       [id number]  DIAB1 - [facility] LIPID,CMP, A1C: 74429, 57975, 44415  (Send-Out)            APPTO  Appointment need  (In-House)            42017  BDCB2 - [facility] CBC w/o diff  (Send-Out)            43432  TSH - [facility] TSH  (Send-Out)            84360  BDUAM [facility] Urinalysis, automated, with micro  (Send-Out)            44070  RIBBA - [facility] Hepatitis C antibody with reflex  (Send-Out)              Procedures Ordered:       REFER  Referral to Specialist or Other Facility  (Send-Out)                      Plan:         Type 2 diabetes mellitus without complications    LABORATORY:  Labs ordered to be performed today include Diabetes Panel 1; CMP, Lipid, A1C.      RECOMMENDATIONS given include: need for yearly flu shots.  MIPS Vaccines Flu and Pneumonia updated in Shot record Colorectal Cancer Screening is up to date and the results are in the chart     FOLLOW-UP: Schedule a follow-up visit in 6 months.:.            Prescriptions:       [Refilled] metFORMIN 500 mg oral tablet [TAKE ONE TABLET BY MOUTH TWICE A DAY], #180 (one hundred and  eighty) tablets, Refills: 1 (one)           Orders:       [id number]  DIAB1 - [facility] LIPID,CMP, A1C: 27103, 93130, 30060  (Send-Out)            APPTO  Appointment need  (In-House)            3017F  Colorectal CA screen results documented and reviewed (PV)  (In-House)              Essential (primary) hypertension    LABORATORY:  Labs ordered to be performed today include CBC W/O DIFF, TSH, and urinalysis with micro.            Prescriptions:       [Refilled] lisinopriL-hydrochlorothiazide 20-25 mg oral tablet [TAKE ONE TABLET BY MOUTH DAILY], #90 (ninety) tablets, Refills: 1 (one)       [Refilled] amLODIPine 10 mg oral tablet [1 tablet daily.], #90 (ninety) tablets, Refills: 1 (one)           Orders:       75807  BDCB2 - [facility] CBC w/o diff  (Send-Out)            23116  TSH - [facility] TSH  (Send-Out)            78473  BDUAM - [facility] Urinalysis, automated, with micro  (Send-Out)              Pain in left shoulder        RADIOLOGY:  I have ordered Shoulder x-ray: left shoulder to be done today.      REFERRALS:  Referral initiated to an orthopedist ( Dr. Stalin Bains ).            Orders:       [id number]  Left Radiologic exam, shoulder; comp, 2 views  (Send-Out)            REFER  Referral to Specialist or Other Facility  (Send-Out)              Encounter for screening for other viral diseases    LABORATORY:  Labs ordered to be performed today include Hepatitis C antibody with reflex.            Orders:       12023  RIBBA - [facility] Hepatitis C antibody with reflex  (Send-Out)                  Other Prescriptions:       [Refilled] diclofenac sodium 75 mg oral tablet, delayed release (enteric coated) [take 1 tablet (75 mg) by oral route 2 times per day], #180 (one hundred and eighty) tablets, Refills: 1 (one)         Patient Recommendations:        For  Type 2 diabetes mellitus without complications:    Obtain a flu shot each year.  Schedule a follow-up visit in 6 months.                APPOINTMENT INFORMATION:        Monday Tuesday Wednesday   Thursday Friday Saturday Sunday            Time:___________________AM  PM   Date:_____________________             Charge Capture:         Primary Diagnosis:     E11.9  Type 2 diabetes mellitus without complications           Orders:      30479  Office/outpatient visit; established patient, level 4  (In-House)            APPTO  Appointment need  (In-House)            3017F  Colorectal CA screen results documented and reviewed (PV)  (In-House)              I10  Essential (primary) hypertension     M25.512  Pain in left shoulder     Z11.59  Encounter for screening for other viral diseases

## 2021-05-18 NOTE — PROGRESS NOTES
Melany Maguire 1960     Office/Outpatient Visit    Visit Date:  01:00 pm    Provider: Leighton Finch MD (Assistant: Maria Guadalupe Schreiber MA)    Location: [facility]        Electronically signed by Leighton Finch MD on  2019 05:42:38 PM                             SUBJECTIVE:        CC:     Mrs. Maguire is a 59 year old White female.  dysuria;         HPI:         Annual exam noted.  She cannot recall when she last had a physical exam.    She underwent colonoscopy 2 years ago.   Preventative Health updated today.  Tobacco: She has never smoked.      ROS:     CONSTITUTIONAL:  Negative for chills and fever.      EYES:  Negative for blurred vision and eye drainage.      E/N/T:  Negative for ear pain, nasal congestion and sore throat.      CARDIOVASCULAR:  Positive for pedal edema ( mild ).   Negative for chest pain or palpitations.      RESPIRATORY:  Negative for recent cough and dyspnea.      GASTROINTESTINAL:  Negative for abdominal pain, anorexia, constipation, diarrhea, nausea and vomiting.      GENITOURINARY:  Positive for dysuria.   Negative for hematuria.      MUSCULOSKELETAL:  Negative for myalgias.      NEUROLOGICAL:  Positive for paresthesia ( bilateral lower extremity; GABAPENTIN HELPING ).   Negative for headaches or weakness.          PMH/FMH/SH:     Last Reviewed on 2019 01:51 PM by Leighton Finch    Past Medical History:                 PAST MEDICAL HISTORY             GYNECOLOGICAL HISTORY:     miscarriage 1         CURRENT MEDICAL PROVIDERS:    Obstetrician/Gynecologist         PREVENTIVE HEALTH MAINTENANCE             COLORECTAL CANCER SCREENING: Up to date (colonoscopy q10y; sigmoidoscopy q5y; Cologuard q3y) was last done , Results are in chart; colonoscopy with the following abnormalities noted-- Polyp(s); The next colonoscopy is due  ;;     MAMMOGRAM: was last done          Surgical History:         Cholecystectomy     : X 2;      Dilation and Curettage         Family History:         Positive for Congestive Heart Failure ( mother ) and Coronary Artery Disease ( father; sister ).      Positive for Type 2 Diabetes ( mother; sister ).          Social History:     Occupation: [employer]     Marital Status:      Children: 2 children         Tobacco/Alcohol/Supplements:     Last Reviewed on 2019 01:50 PM by Leighton Finch    Tobacco: She has never smoked.          Alcohol:  Does not drink alcohol and never has.      Caffeine:  She admits to consuming caffeine via tea ( 2 servings per day ) and soda ( 2 servings per day ).          Substance Abuse History:     Last Reviewed on 2019 01:50 PM by Leighton Finch    None         Mental Health History:     Last Reviewed on 2018 11:03 AM by Elysia Peterson        Communicable Diseases (eg STDs):     Last Reviewed on 2018 11:03 AM by Elysia Peterson            Current Problems:     Last Reviewed on 2019 01:55 PM by Leighton Finch    Peripheral neuropathy secondary to uncontrolled type II diabetes     Use of high risk medications     HTN     Venous insufficiency     NIDDM     History of colonic polyps     Elevated triglycerides     Dysuria         Immunizations:     None        Allergies:     Last Reviewed on 2019 01:51 PM by Leighton Finch    Penicillins:        Current Medications:     Last Reviewed on 2019 01:55 PM by Leighton Finch    Metformin HCl 500mg Tablet 1 tab bid     Lisinopril/Hydrochlorothiazide 20mg/25mg Tablet Take 1 tablet(s) by mouth daily     Gabapentin 300mg Capsules Take 1 capsule(s) by mouth bid PRN     Amlodipine  10mg Tablet 1 tablet daily.     Vitamin C     OTC Glusoimaine Take one tablet once daily     OTC Tumeric Take one tablet once daily     OTC Vitamin B12 Take one tablet once daily         OBJECTIVE:        Vitals:         Current: 2019 1:24:19  PM    Ht:  5 ft, 7 in;  Wt: 301.6 lbs;  BMI: 47.2    T: 98.3 F (oral);  BP: 163/65 mm Hg (left arm, sitting);  P: 100 bpm (left arm (BP Cuff), sitting);  sCr: 0.71 mg/dL;  GFR: 117.85        Exams:     PHYSICAL EXAM:     GENERAL: vital signs recorded - well developed, well nourished;  no apparent distress;     EYES: conjunctiva and cornea are normal;     E/N/T:  normal EACs, TMs, nasal/oral mucosa, teeth, gingiva, and oropharynx;     NECK: trachea is midline; thyroid is non-palpable;     RESPIRATORY: normal respiratory rate and pattern with no distress; normal breath sounds with no rales, rhonchi, wheezes or rubs;     CARDIOVASCULAR: normal rate; rhythm is regular;  no systolic murmur; trace pedal edema;     GASTROINTESTINAL: nontender, nondistended; no hepatosplenomegaly or masses; no bruits;     LYMPHATIC: no enlargement of cervical or facial nodes; no supraclavicular nodes;     MUSCULOSKELETAL: normal gait; normal overall tone     NEUROLOGIC: GROSSLY INTACT     PSYCHIATRIC:  appropriate affect and demeanor; normal speech pattern; grossly normal memory;     Left foot exam    Protective sensation using Monofilament test: NORMAL sensation. Patient detects .07 grams of force which is considered normal.    Vascular status: normal peripheral vascular exam with palpable dorsal pedal and posterior tibal pulses and brisk digital capillary refill    Skin is intact without sores or ulcers    Right foot exam    Protective sensation using Monofilament test: NORMAL sensation. Patient detects .07 grams of force which is considered normal.    Vascular status: normal peripheral vascular exam with palpable dorsal pedal and posterior tibal pulses and brisk digital capillary refill    Skin is intact without sores or ulcers         ASSESSMENT           V70.0   Z00.00  Annual exam              DDx:     250.00   E11.9  NIDDM              DDx:     401.1   I10  HTN              DDx:     788.1   R30.0  Dysuria              DDx:     V73.89    Z11.59  Screening test for viral diseases - other              DDx:         ORDERS:         Lab Orders:       [id number]  BDUAM - [facility] Urinalysis, automated, with micro  (Send-Out)         14665  DIAB1 - [facility] LIPID,CMP, A1C: 96086, 21709, 54048  (Send-Out)         42527  BDCBC - [facility] CBC with 3 part diff  (Send-Out)         92008  TSH - [facility] TSH  (Send-Out)         92409  RIBBA - [facility] Hepatitis C antibody with reflex  (Send-Out)           Other Orders:       2028F  Foot examination performed (includes examination through visual inspection, sensory exam with monofi  (In-House)                   PLAN:          Annual exam         COUNSELING provided on: breast self-exam, healthy eating habits, regular exercise, use of seat belts, and ADVISED TO SEE AN EYE DOCTOR AND DENTIST REGULARLY.      FOLLOW-UP: Schedule a follow-up visit in 6 months. AND WITH HER OB/GYN FOR A [employer] MIPS Vaccines Flu and Pneumonia updated in Shot record          NIDDM     LABORATORY:  Labs ordered to be performed today include Diabetes Panel 1; CMP, Lipid, A1C.            Orders:       81955  DIAB1 - [facility] LIPID,CMP, A1C: 98683, 52767, 24672  (Send-Out)         2028F  Foot examination performed (includes examination through visual inspection, sensory exam with monofi  (In-House)            HTN     LABORATORY:  Labs ordered to be performed today include CBC and TSH.            Orders:       [id number]  BDCBC - [facility] CBC with 3 part diff  (Send-Out)         93311  TSH - [facility] TSH  (Send-Out)            Dysuria           Orders:       50812  BDUAM - [facility] Urinalysis, automated, with micro  (Send-Out)            Screening test for viral diseases - other     LABORATORY:  Labs ordered to be performed today include Hepatitis C antibody with reflex.            Orders:       98094  RIBBA - [facility] Hepatitis C antibody with reflex  (Send-Out)               Patient Recommendations:        For  Annual exam:         You should regularly examine your breasts, easily done while in the  shower or with lotion.  Feel and look for differences in consistency from month to month, especially noting knots or lumps, changes in skin appearance, nipple retraction or discharge.    Limit dietary intake of fat (especially saturated fat) and cholesterol.  Eat a variety of foods, including plenty of fruits, vegetables, and grain containg fiber, limit fat intake to 30% of total calories. Balance caloric intake with energy expended.    Maintaining regular physical activity is advised to help prevent heart disease, hypertension, diabetes, and obesity.    Always use shoulder/lap restraints when driving or riding in a vehicle, even those equipped with air bags.  Schedule a follow-up visit in 6 months.              CHARGE CAPTURE           **Please note: ICD descriptions below are intended for billing purposes only and may not represent clinical diagnoses**        Primary Diagnosis:         V70.0 Annual exam            Z00.00    Encounter for general adult medical examination without abnormal findings              Orders:          11572   Preventive medicine, established patient, age 40-64 years  (In-House)           250.00 NIDDM            E11.9    Type 2 diabetes mellitus without complications              Orders:          2028F   Foot examination performed (includes examination through visual inspection, sensory exam with monofi  (In-House)           401.1 HTN            I10    Essential (primary) hypertension    788.1 Dysuria            R30.0    Dysuria    V73.89 Screening test for viral diseases - other            Z11.59    Encounter for screening for other viral diseases

## 2021-05-18 NOTE — PROGRESS NOTES
Melany Maguire 1960     Office/Outpatient Visit    Visit Date: Wed, Aug 22, 2018 09:01 am    Provider: Leighton Finch MD (Assistant: Myrtle Blair MA)    Location: [facility]        Electronically signed by Leighton Finch MD on  08/22/2018 12:55:01 PM                             SUBJECTIVE:        CC:     Ms. Maguire is a 58 year old White female.  This is a follow-up visit.  MED REFILLS; RIGHT ANKLE PAIN;         HPI:         Patient presents with hTN.  Her current cardiac medication regimen includes a diuretic, an ACE inhibitor, and a calcium channel blocker.  She did not bring her blood pressure diary, but says that pressures have been well controlled.  Compliance with treatment has been good.          Ankle pain details; the pain is primarily in the right ankle with pain noted medially.  Swelling is absent.  The pain does not radiate.  She characterizes it as intermittent, moderate in intensity, and sharp.  It began 2 weeks ago.  No precipitating event or injury is identified.      ROS:     CONSTITUTIONAL:  Negative for chills and fever.      E/N/T:  Negative for ear pain, nasal congestion and sore throat.      CARDIOVASCULAR:  Positive for pedal edema ( mild ).   Negative for chest pain or palpitations ( NO MORE PALPITATIONS, NEG W/U LAST YEAR ).      RESPIRATORY:  Negative for recent cough and dyspnea.      GASTROINTESTINAL:  Negative for abdominal pain, nausea and vomiting.      NEUROLOGICAL:  Negative for headaches.      PSYCHIATRIC:  Positive for feelings of stress.          PMH/FMH/SH:     Last Reviewed on 8/22/2018 09:30 AM by Leighton Finch    Past Medical History:             GYNECOLOGICAL HISTORY:    Last mammogram was 2012         CURRENT MEDICAL PROVIDERS:    Obstetrician/Gynecologist         PREVENTIVE HEALTH MAINTENANCE             COLORECTAL CANCER SCREENING:; colonoscopy with the following abnormalities noted-- Polyp(s); The next colonoscopy is due  2027;;;  2017;;     INFLUENZA VACCINE: declines, understands reason for immunization 2016;;         Surgical History:         Cholecystectomy    : X 2;      Dilation and Curettage         Family History:         Positive for Congestive Heart Failure ( mother ) and Coronary Artery Disease ( father; sister ).      Positive for Type 2 Diabetes ( mother; sister ).          Social History:     Occupation: [employer]     Marital Status:      Children: 2 children         Tobacco/Alcohol/Supplements:     Last Reviewed on 2018 09:30 AM by Leighton Finch    Tobacco: She has never smoked.          Alcohol:  Does not drink alcohol and never has.      Caffeine:  She admits to consuming caffeine via tea ( 2 servings per day ) and soda ( 2 servings per day ).          Substance Abuse History:     Last Reviewed on 2018 09:29 AM by Leighton Finch    None         Mental Health History:     Last Reviewed on 2018 09:44 AM by Yareli Turner        Communicable Diseases (eg STDs):     Last Reviewed on 2018 09:44 AM by Yareli Turner            Current Problems:     Last Reviewed on 2018 09:31 AM by Leighton Finch    HTN     Venous insufficiency     NIDDM (diet-controlled)     History of colonic polyps     Elevated triglycerides     Palpitations         Immunizations:     None        Allergies:     Last Reviewed on 2018 09:30 AM by Leighton Finch    Penicillins:        Current Medications:     Last Reviewed on 2018 09:30 AM by Leighton Finch    Amlodipine  10mg Tablet 1 tablet daily.     Lisinopril/Hydrochlorothiazide 20mg/25mg Tablet Take 1 tablet(s) by mouth daily     OTC Glusoimaine Take one tablet once daily     OTC Tumeric Take one tablet once daily     OTC Vitamin B12 Take one tablet once daily         OBJECTIVE:        Vitals:         Current: 2018 9:06:47 AM    Ht:  5 ft, 7 in;  Wt: 308.6 lbs;  BMI: 48.3    T: 97.4 F (oral);   BP: 150/93 mm Hg (left arm, sitting);  P: 83 bpm (left arm (BP Cuff), sitting);  sCr: 0.94 mg/dL;  GFR: 90.96        Exams:     PHYSICAL EXAM:     GENERAL: vital signs recorded - well developed, well nourished;  no apparent distress;     NECK: trachea is midline; thyroid is non-palpable;     RESPIRATORY: normal respiratory rate and pattern with no distress; normal breath sounds with no rales, rhonchi, wheezes or rubs;     CARDIOVASCULAR: normal rate; rhythm is regular;  no systolic murmur; trace pedal edema;     LYMPHATIC: no enlargement of cervical or facial nodes;     NEUROLOGIC: GROSSLY INTACT     PSYCHIATRIC:  appropriate affect and demeanor; normal speech pattern; grossly normal memory; RIGHT ANKLE examination:     Inspection: normal;     Palpation: medial pain;     Neurovascular: normal;     Muscular Strength: normal;     Range of Motion: full active ROM;     Maneuvers: (-) Vicky's sign;             ASSESSMENT           401.1   I10  HTN              DDx:     250.00   E11.9  NIDDM (diet-controlled)              DDx:     719.47   M25.571  Right Ankle pain              DDx:     785.1   R00.2  Palpitations              DDx:         ORDERS:         Meds Prescribed:       Refill of: Amlodipine  10mg Tablet 1 tablet daily.  #30 (Thirty) tablet(s) Refills: 5       Refill of: Lisinopril/Hydrochlorothiazide 20mg/25mg Tablet Take 1 tablet(s) by mouth daily  #30 (Thirty) tablet(s) Refills: 5       Meloxicam 15mg Tablet Take 1 tablet(s) by mouth daily  #15 (Fifteen) tablet(s) Refills: 0         Radiology/Test Orders:       [id number]  Radiologic examination, right ankle complete minimum 3 views  (Send-Out)           Lab Orders:       [id number]  BDCBC - [facility] CBC with 3 part diff  (Send-Out)         [id number]  TSH - [facility] TSH  (Send-Out)         29073  DIAB2 - [facility] CMP A1C LIPID AND MICRO ALBUM CR RATIO: [id number]  (Send-Out)         APPTO  Appointment need  (In-House)                   PLAN:          HTN      LABORATORY:  Labs ordered to be performed today include CBC and TSH.      FOLLOW-UP: Schedule a follow-up visit in 6 months..            Prescriptions:       Refill of: Amlodipine  10mg Tablet 1 tablet daily.  #30 (Thirty) tablet(s) Refills: 5       Refill of: Lisinopril/Hydrochlorothiazide 20mg/25mg Tablet Take 1 tablet(s) by mouth daily  #30 (Thirty) tablet(s) Refills: 5           Orders:       [id number]  BDCBC - [facility] CBC with 3 part diff  (Send-Out)         15857  TSH - [facility] TSH  (Send-Out)         APPTO  Appointment need  (In-House)            NIDDM (diet-controlled)     LABORATORY:  Labs ordered to be performed today include Diabetes Panel 2;CMP, A1C, Lipid, Microalbumin:Creatinine Ratio.            Orders:       [id number]  DIAB2 - [facility] CMP A1C LIPID AND MICRO ALBUM CR RATIO: [id number]  (Send-Out)           Right Ankle pain         RADIOLOGY:  I have ordered a right ankle xray to be done today.      MEDICATIONS: I have prescribed an NSAID.      RECOMMENDATIONS given include: ice therapy, elevation of the foot as much as possible, and WEAR AN ACE WRAP.      CONSIDER PT/PRTHO EVAL           Prescriptions:       Meloxicam 15mg Tablet Take 1 tablet(s) by mouth daily  #15 (Fifteen) tablet(s) Refills: 0           Orders:       [id number]  Radiologic examination, right ankle complete minimum 3 views  (Send-Out)            Palpitations     Observe as improved             Patient Recommendations:        For  HTN:     Schedule a follow-up visit in 6 months.                APPOINTMENT INFORMATION:        Monday Tuesday Wednesday Thursday Friday Saturday Sunday            Time:___________________AM  PM   Date:_____________________         For Right Ankle pain:     right ankle x-ray Use ice over the affected area. Keep the foot elevated as much as possible.              CHARGE CAPTURE           **Please note: ICD descriptions below are intended for billing purposes only and may not represent clinical  diagnoses**        Primary Diagnosis:         401.1 HTN            I10    Essential (primary) hypertension              Orders:          [id number]   Office/outpatient visit; established patient, level 4  (In-House)             APPTO   Appointment need  (In-House)           250.00 NIDDM (diet-controlled)            E11.9    Type 2 diabetes mellitus without complications    719.47 Right Ankle pain            M25.571    Pain in right ankle and joints of right foot    785.1 Palpitations            R00.2    Palpitations        ADDENDUMS:      ____________________________________    Addendum: 08/28/2018 04:08 PM - Alecia Lindsey         Visit Note Faxed to:        [facility]  (Nutritionist); Number [phone]            Addendum: 09/25/2018 10:11 AM - Alecia Lindsey         Visit Note Faxed to:        [facility]  (Nutritionist); Number [phone]            Date: 11/28/2018 11:41 AM    Author: Alecia Lindsey         Visit Note Faxed to:        User Entered Recipient; Number [phone]            Addendum: 11/30/2018 09:22 AM - Alecia Lindsey         Visit Note Faxed to:        User Entered Recipient; Number [phone]

## 2021-05-18 NOTE — PROGRESS NOTES
Melany Maguire 1960     Office/Outpatient Visit    Visit Date: Thu, May 9, 2019 03:32 pm    Provider: Leighton Finch MD (Assistant: Myrtle Blair MA)    Location: [facility]        Electronically signed by Leighton Finch MD on  2019 06:45:22 PM                             SUBJECTIVE:        CC:     Mrs. Maguire is a 58 year old White female.  This is a follow-up visit.  CHECK UP;         HPI:         Patient to be evaluated for nIDDM.  Specifically, this is type 2, non-insulin requiring diabetes.  Compliance with treatment has been good.  Current meds include an oral hypoglycemic.  Most recent lab results include Hemoglobin A1c:  6.5 (%) (2017),  9.0 (%) (2018), Microalbumin, Urine, rand:  60.1 (mg/L) (2018).  HAS HAD DIABETES EDUCATION         In regard to the hTN, her current cardiac medication regimen includes a diuretic, an ACE inhibitor, and a calcium channel blocker.  She did not bring her blood pressure diary, but says that pressures have been well controlled.  Compliance with treatment has been good.      ROS:     CONSTITUTIONAL:  Negative for chills and fever.      CARDIOVASCULAR:  Positive for pedal edema ( mild ).   Negative for chest pain or palpitations.      RESPIRATORY:  Negative for recent cough and dyspnea.      GASTROINTESTINAL:  Negative for abdominal pain, nausea and vomiting.      MUSCULOSKELETAL:  Negative for myalgias.      NEUROLOGICAL:  Positive for paresthesia ( bilateral lower extremity; GABAPENTIN HELPING ).   Negative for headaches.          PMH/FMH/SH:     Last Reviewed on 2019 06:36 PM by Leighton Finch    Past Medical History:                 PAST MEDICAL HISTORY             GYNECOLOGICAL HISTORY:     miscarriage 1         CURRENT MEDICAL PROVIDERS:    Obstetrician/Gynecologist         PREVENTIVE HEALTH MAINTENANCE             COLORECTAL CANCER SCREENING: Up to date (colonoscopy q10y; sigmoidoscopy q5y;  Cologuard q3y) was last done , Results are in chart; colonoscopy with the following abnormalities noted-- Polyp(s); The next colonoscopy is due  ;;     MAMMOGRAM: was last done          Surgical History:         Cholecystectomy    : X 2;      Dilation and Curettage         Family History:         Positive for Congestive Heart Failure ( mother ) and Coronary Artery Disease ( father; sister ).      Positive for Type 2 Diabetes ( mother; sister ).          Social History:     Occupation: [employer]     Marital Status:      Children: 2 children         Tobacco/Alcohol/Supplements:     Last Reviewed on 2019 06:35 PM by Leighton Finch    Tobacco: She has never smoked.          Alcohol:  Does not drink alcohol and never has.      Caffeine:  She admits to consuming caffeine via tea ( 2 servings per day ) and soda ( 2 servings per day ).          Substance Abuse History:     Last Reviewed on 2019 06:35 PM by Leighton Finch    None         Mental Health History:     Last Reviewed on 2018 11:03 AM by Elysia Peterson        Communicable Diseases (eg STDs):     Last Reviewed on 2018 11:03 AM by Elysia Peterson            Current Problems:     Last Reviewed on 2019 06:38 PM by Leighton Finch    Peripheral neuropathy secondary to uncontrolled type II diabetes     Use of high risk medications     HTN     Venous insufficiency     NIDDM     History of colonic polyps     Elevated triglycerides         Immunizations:     None        Allergies:     Last Reviewed on 2019 06:35 PM by Leighton Finch    Penicillins:        Current Medications:     Last Reviewed on 2019 06:37 PM by Leighton Finch    Gabapentin 300mg Capsules Take 1 capsule(s) by mouth bid PRN     Lisinopril/Hydrochlorothiazide 20mg/25mg Tablet Take 1 tablet(s) by mouth daily     Amlodipine  10mg Tablet 1 tablet daily.     Metformin HCl 500mg Tablet 1  tab bid     OTC Glusoimaine Take one tablet once daily     OTC Tumeric Take one tablet once daily     OTC Vitamin B12 Take one tablet once daily     Vitamin C         OBJECTIVE:        Vitals:         Current: 5/9/2019 3:38:00 PM    Ht:  5 ft, 7 in;  Wt: 302.4 lbs;  BMI: 47.4    T: 98.2 F (oral);  BP: 134/77 mm Hg (left arm, sitting);  P: 88 bpm (left arm (BP Cuff), sitting);  sCr: 0.65 mg/dL;  GFR: 130.42        Exams:     PHYSICAL EXAM:     GENERAL: vital signs recorded - well developed, well nourished;  no apparent distress;     NECK: trachea is midline; thyroid is non-palpable;     RESPIRATORY: normal respiratory rate and pattern with no distress; normal breath sounds with no rales, rhonchi, wheezes or rubs;     CARDIOVASCULAR: normal rate; rhythm is regular;  no systolic murmur; trace pedal edema;     LYMPHATIC: no enlargement of cervical or facial nodes;     NEUROLOGIC: GROSSLY INTACT     PSYCHIATRIC:  appropriate affect and demeanor; normal speech pattern; grossly normal memory;         Lab/Test Results:             Urine temperature:  confirmed (05/09/2019),     All urine drug screen levels confirmed negative:  yes (05/09/2019),     Date and time of last pill:  gabapentin 5-9-19 at 0800/kh (05/09/2019),     Performed by:  bubba (05/09/2019),     Collection Time:  1630 (05/09/2019),             ASSESSMENT           250.00   E11.9  NIDDM              DDx:     401.1   I10  HTN              DDx:     250.62   E11.42  Peripheral neuropathy secondary to uncontrolled type II diabetes              DDx:     V58.69   Z79.899  Use of high risk medications              DDx:         ORDERS:         Lab Orders:       [id number]  DIAB1 - H LIPID,CMP, A1C: 29732, 29967, 21838  (Send-Out)         APPTO  Appointment need  (In-House)         43126  Drug test prsmv qual dir optical obs per day  (In-House)                   PLAN:          NIDDM     LABORATORY:  Labs ordered to be performed today include Diabetes Panel 1; CMP, Lipid,  A1C.      FOLLOW-UP: Schedule a follow-up visit in 4 months..            Orders:       [id number]  DIAB[address] [place] [address] LIPID,CMP, A1C: 78602, 66733, 59398  (Send-Out)         APPTO  Appointment need  (In-House)            HTN     AS ABOVE          Peripheral neuropathy secondary to uncontrolled type II diabetes     Controlled substance documentation: Alexis reviewed; drug screen performed and appropriate; consent is reviewed and signed and on the chart.  She is aware of risk of addiction on this medication, understands that she will need to follow up for a review every 3 months and her medications will be adjusted or decreased as deemed appropriate at each visit.  No history of drug or alcohol abuse.  No concerns about diversion or abuse. She denies side effects related to the medication.  She is aware that she may be called in for pill counts.  The dosing of this medication will be reviewed on a regular basis and reduced if possible..  Ongoing use of a controlled substance is necessary for this patient to have a normal quality of life          Use of high risk medications     LABORATORY:  Labs ordered to be performed today include Drug screen.            Orders:       [id number]  Drug test prsmv qual dir optical obs per day  (In-House)               Patient Recommendations:        For  NIDDM:     Schedule a follow-up visit in 4 months.                APPOINTMENT INFORMATION:        Monday Tuesday Wednesday Thursday Friday Saturday Sunday            Time:___________________AM  PM   Date:_____________________             CHARGE CAPTURE           **Please note: ICD descriptions below are intended for billing purposes only and may not represent clinical diagnoses**        Primary Diagnosis:         250.00 NIDDM            E11.9    Type 2 diabetes mellitus without complications              Orders:          [id number]   Office/outpatient visit; established patient, level 4  (In-House)             APPTO   Appointment need  (In-House)            401.1 HTN            I10    Essential (primary) hypertension    250.62 Peripheral neuropathy secondary to uncontrolled type II diabetes            E11.42    Type 2 diabetes mellitus with diabetic polyneuropathy    V58.69 Use of high risk medications            Z79.899    Other long term (current) drug therapy              Orders:          [id number]   Drug test prsmv qual dir optical obs per day  (In-House)

## 2021-05-18 NOTE — PROGRESS NOTES
Melany Maguire 1960     Office/Outpatient Visit    Visit Date: Fri, Dec 14, 2018 11:03 am    Provider: Leighton Finch MD (Assistant: Elysia Peterson MA)    Location: [facility]        Electronically signed by Leighton Finch MD on  12/14/2018 02:31:08 PM                             SUBJECTIVE:        CC:     Ms. Maguire is a 58 year old White female.  Patient is here for 3 month check up and possible lab work.;         HPI:         Patient to be evaluated for nIDDM.  Specifically, this is type 2, non-insulin requiring diabetes.  Compliance with treatment has been good.  Current meds include an oral hypoglycemic.  She does not perform home blood glucose monitoring.  Most recent lab results include Hemoglobin A1c:  6.5 (%) (08/11/2017),  9.0 (%) (08/22/2018), Microalbumin, Urine, rand:  60.1 (mg/L) (08/22/2018).  In regard to preventative care, her last ophthalmology exam was in ADVISED TO DO SO.  DID NOT SEE THE DM EDUCATOR         HTN details; her current cardiac medication regimen includes a diuretic, an ACE inhibitor, and a calcium channel blocker.  She did not bring her blood pressure diary, but says that pressures have been well controlled.  Compliance with treatment has been good.      ROS:     CONSTITUTIONAL:  Negative for chills and fever.      E/N/T:  Negative for ear pain, nasal congestion and sore throat.      CARDIOVASCULAR:  Positive for pedal edema ( mild ).   Negative for chest pain or palpitations ( NO MORE PALPITATIONS, NEG W/U LAST YEAR ).      RESPIRATORY:  Negative for recent cough and dyspnea.      GASTROINTESTINAL:  Negative for abdominal pain, nausea and vomiting.      NEUROLOGICAL:  Negative for headaches.      PSYCHIATRIC:  Positive for feelings of stress.          PMH/FMH/SH:     Last Reviewed on 12/14/2018 11:46 AM by Leighton Finch    Past Medical History:             GYNECOLOGICAL HISTORY:    Last mammogram was 2012         CURRENT MEDICAL  PROVIDERS:    Obstetrician/Gynecologist         PREVENTIVE HEALTH MAINTENANCE             COLORECTAL CANCER SCREENING: Up to date (colonoscopy q10y; sigmoidoscopy q5y; Cologuard q3y) was last done , Results are in chart; colonoscopy with the following abnormalities noted-- Polyp(s); The next colonoscopy is due  ;;         Surgical History:         Cholecystectomy    : X 2;      Dilation and Curettage         Family History:         Positive for Congestive Heart Failure ( mother ) and Coronary Artery Disease ( father; sister ).      Positive for Type 2 Diabetes ( mother; sister ).          Social History:     Occupation: [employer]     Marital Status:      Children: 2 children         Tobacco/Alcohol/Supplements:     Last Reviewed on 2018 11:43 AM by Leighton Finch    Tobacco: She has never smoked.          Alcohol:  Does not drink alcohol and never has.      Caffeine:  She admits to consuming caffeine via tea ( 2 servings per day ) and soda ( 2 servings per day ).          Substance Abuse History:     Last Reviewed on 2018 11:43 AM by Leighton Finch    None         Mental Health History:     Last Reviewed on 2018 11:03 AM by Elysia Peterson        Communicable Diseases (eg STDs):     Last Reviewed on 2018 11:03 AM by Elysai Peterson            Current Problems:     Last Reviewed on 2018 11:47 AM by Leighton Finch    HTN     Venous insufficiency     NIDDM     History of colonic polyps     Elevated triglycerides     Ankle pain         Immunizations:     None        Allergies:     Last Reviewed on 2018 11:43 AM by Leighton Finch    Penicillins:        Current Medications:     Last Reviewed on 2018 11:47 AM by Leighton Finch    Metformin HCl 500mg Tablet One PO QD with largest meal.     Amlodipine  10mg Tablet 1 tablet daily.     Lisinopril/Hydrochlorothiazide 20mg/25mg Tablet Take 1 tablet(s) by  mouth daily     Meloxicam 15mg Tablet Take 1 tablet(s) by mouth daily     OTC Glusoimaine Take one tablet once daily     OTC Tumeric Take one tablet once daily     OTC Vitamin B12 Take one tablet once daily         OBJECTIVE:        Vitals:         Current: 12/14/2018 11:07:00 AM    Ht:  5 ft, 7 in;  Wt: 303.8 lbs;  BMI: 47.6    T: 98.2 F (oral);  BP: 152/87 mm Hg (left arm, sitting);  P: 79 bpm (left arm (BP Cuff), sitting);  sCr: 0.72 mg/dL;  GFR: 117.97        Exams:     PHYSICAL EXAM:     GENERAL: vital signs recorded - well developed, well nourished;  no apparent distress;     NECK: trachea is midline; thyroid is non-palpable;     RESPIRATORY: normal respiratory rate and pattern with no distress; normal breath sounds with no rales, rhonchi, wheezes or rubs;     CARDIOVASCULAR: normal rate; rhythm is regular;  no systolic murmur; trace pedal edema;     LYMPHATIC: no enlargement of cervical or facial nodes;     NEUROLOGIC: GROSSLY INTACT     PSYCHIATRIC:  appropriate affect and demeanor; normal speech pattern; grossly normal memory;         ASSESSMENT           250.00   E11.9  NIDDM              DDx:     401.1   I10  HTN              DDx:         ORDERS:         Meds Prescribed:       Refill of: Meloxicam 15mg Tablet Take 1 tablet(s) by mouth daily  #30 (Thirty) tablet(s) Refills: 2         Lab Orders:       [id number]  DIAB2 - [facility] CMP A1C LIPID AND MICRO ALBUM CR RATIO: [id number]  (Send-Out)         APPTO  Appointment need  (In-House)                   PLAN:          NIDDM     LABORATORY:  Labs ordered to be performed today include Diabetes Panel 2;CMP, A1C, Lipid, Microalbumin:Creatinine Ratio.      FOLLOW-UP: Schedule a follow-up visit in 4 months..      WE WILL RESCHEDULE THE DM EDUCATION APPT           Orders:       [id number]  DIAB2 - [facility] CMP A1C LIPID AND MICRO ALBUM CR RATIO: [id number]  (Send-Out)         APPTO  Appointment need  (In-House)            HTN         MEDICATIONS:  (no change to current medication regimen)             Other Prescriptions:       Refill of: Meloxicam 15mg Tablet Take 1 tablet(s) by mouth daily  #30 (Thirty) tablet(s) Refills: 2         Patient Recommendations:        For  NIDDM:     Schedule a follow-up visit in 4 months.                APPOINTMENT INFORMATION:        Monday Tuesday Wednesday Thursday Friday Saturday Sunday            Time:___________________AM  PM   Date:_____________________             CHARGE CAPTURE           **Please note: ICD descriptions below are intended for billing purposes only and may not represent clinical diagnoses**        Primary Diagnosis:         250.00 NIDDM            E11.9    Type 2 diabetes mellitus without complications              Orders:          [id number]   Office/outpatient visit; established patient, level 4  (In-House)             APPTO   Appointment need  (In-House)           401.1 HTN            I10    Essential (primary) hypertension        ADDENDUMS:      ____________________________________    Addendum: 12/14/2018 02:36 PM - Ctarachita Ryder         Visit Note Faxed to:        User Entered Recipient; Number [phone]

## 2021-05-18 NOTE — PROGRESS NOTES
Melany Maguire  1960     Office/Outpatient Visit    Visit Date: Wed, Dec 30, 2020 01:56 pm    Provider: Vikas Maldonado MD (Assistant: Jaclyn Martins, )    Location: [facility]        Electronically signed by Vikas Maldonado MD on  12/30/2020 02:28:52 PM                             Subjective:        CC: Mrs. Maguire is a 60 year old White female.  cough, congestion, headache, took a rapid test this morning and was negative;         HPI:           Patient to be evaluated for acute upper respiratory infection, unspecified.  These have been present for the past 3 days.  The symptoms include chest congestion, cough, headache, nasal congestion, nasal discharge, sinus pain/pressure, wheezing and dyspnea.  She denies body aches, Chills or fever.  She denies exposure to ill contacts.  She has already tried to relieve the symptoms with Karen Moreno.  Medical history is significant for diabetes.      ROS:     CONSTITUTIONAL:  Negative for chills, fatigue, fever, and weight change.      E/N/T:  Positive for nasal congestion, frequent rhinorrhea and sinus pressure.   Negative for ear pain, tinnitus or sore throat.      CARDIOVASCULAR:  Negative for chest pain, dizziness, palpitations and edema.      RESPIRATORY:  Positive for dyspnea, frequent wheezing, cough and chest congestion.      GASTROINTESTINAL:  Positive for heartburn.   Negative for abdominal pain, diarrhea, nausea or vomiting.      GENITOURINARY:  Negative for dysuria, hematuria and polyuria.      MUSCULOSKELETAL:  Negative for arthralgias and myalgias.      INTEGUMENTARY/BREAST:  Negative for rash, breast mass and skin changes of breast.      NEUROLOGICAL:  Positive for headaches.   Negative for paresthesias or weakness.          Past Medical History / Family History / Social History:         Last Reviewed on 12/30/2020 02:28 PM by Vikas Maldonado    Past Medical History:                 PAST MEDICAL HISTORY             GYNECOLOGICAL  HISTORY:     miscarriage 1         CURRENT MEDICAL PROVIDERS:    Neurologist    Obstetrician/Gynecologist         PREVENTIVE HEALTH MAINTENANCE             COLORECTAL CANCER SCREENING: Up to date (colonoscopy q10y; sigmoidoscopy q5y; Cologuard q3y) was last done 2017, Results are in chart; colonoscopy with the following abnormalities noted-- Polyp(s); The next colonoscopy is due  ;;     DENTAL CLEANING: was last done RECOMMENDED 10/15/20     EYE EXAM: RECOMMENDED 10/15/20     MAMMOGRAM: was last done 2019 REPORT NOT IN CHART     PAP SMEAR: was last done 2019 with normal results         Surgical History:         Cholecystectomy    : X 2;     Dilation and Curettage         Family History:         Positive for Congestive Heart Failure ( mother ) and Coronary Artery Disease ( father; sister ).      Positive for Type 2 Diabetes ( mother; sister ).          Social History:     Occupation: [employer]     Marital Status:      Children: 2 children         Tobacco/Alcohol/Supplements:     Last Reviewed on 2020 02:28 PM by Vikas Maldonado    Tobacco: She has never smoked.          Alcohol:  Does not drink alcohol and never has.      Caffeine:  She admits to consuming caffeine via tea ( 2 servings per day ) and soda ( 2 servings per day ).          Substance Abuse History:     Last Reviewed on 2020 02:28 PM by Vikas Maldonado    None         Mental Health History:     Last Reviewed on 2020 02:28 PM by Vikas Maldonado        Communicable Diseases (eg STDs):     Last Reviewed on 2020 02:28 PM by Vikas Maldonado        Current Problems:     Last Reviewed on 2020 02:28 PM by Vikas Maldonado    Venous insufficiency    History of colonic polyps    Type 2 diabetes mellitus without complications    Essential (primary) hypertension    Peripheral neuropathy secondary to uncontrolled type II diabetes    Shortness of breath    Unspecified acute conjunctivitis, bilateral    Acute  "upper respiratory infection, unspecified        Immunizations:     None        Allergies:     Last Reviewed on 12/30/2020 02:28 PM by Vikas Maldonado    Penicillins:          Current Medications:     Last Reviewed on 12/30/2020 02:28 PM by Vikas Maldonado    vitamin E  [daily]    lisinopriL-hydrochlorothiazide 20-25 mg oral tablet [TAKE ONE TABLET BY MOUTH DAILY]    amLODIPine 10 mg oral tablet [1 tablet daily.]    OTC Glusoimaine Take one tablet once daily      OTC Tumeric Take one tablet once daily      OTC Vitamin B12 Take one tablet once daily      metFORMIN 500 mg oral tablet [TAKE ONE TABLET BY MOUTH TWICE A DAY]    Vitamin C     diclofenac sodium 75 mg oral tablet, delayed release (enteric coated) [take 1 tablet (75 mg) by oral route 2 times per day]    Polytrim 10,000 unit- 1 mg/mL ophthalmic (eye) Drops [2 DROPS IN BOTH EYES TID FOR FIVE DAYS]    albuterol sulfate 90 mcg/actuation Inhalation HFA Aerosol Inhaler [2 PUFFS QID PRN]        Objective:        Vitals:         Current: 12/30/2020 2:02:26 PM    Ht:  5 ft, 7 in;  Wt: 304.4 lbs;  BMI: 47.7T: 98.3 F (temporal);  BP: 147/96 mm Hg (left arm, sitting);  P: 99 bpm (left arm (BP Cuff), sitting);  sCr: 0.72 mg/dL;  GFR: 115.27O2 Sat: 95 % (room air)        Exams:     PHYSICAL EXAM:     GENERAL: vital signs recorded - well developed, well nourished;  no apparent distress;     EYES: conjunctiva and cornea are normal;     E/N/T: EARS: both TMs are have fluid behind them;  NOSE: nasal mucosa is partially obscured by clear drainage and erythematous;  no sinus tenderness; OROPHARYNX: posterior pharynx shows no exudate and erythema;     RESPIRATORY: no rales (\"crackles\") present; no rhonchi; diffuse expiratory wheezes;     CARDIOVASCULAR: normal rate; rhythm is regular;  No murmurs. clicks, gallops or rubs appreciated; no edema;     LYMPHATIC: no enlargement of cervical or facial nodes; no supraclavicular nodes;     BREAST/INTEGUMENT: No significant rashes, lesions or " suspicious moles within limits of examination;     NEUROLOGIC: Grossly intact; mental status: alert and oriented x 3;     PSYCHIATRIC: appropriate affect and demeanor; normal speech pattern; Normal behavior;         Assessment:         J06.9   Acute upper respiratory infection, unspecified           ORDERS:         Meds Prescribed:       [New Rx] predniSONE 50 mg oral tablet [take 1 once daily], #5 (five) tablets, Refills: 0 (zero)       [New Rx] promethazine-DM 6.25-15 mg/5 mL oral Syrup [take 5 milliliters by oral route every 4 hours as needed, not to exceed 30 mL in 24 hours], #118 (one hundred and eighteen) milliliters, Refills: 0 (zero)         Lab Orders:       [id number]  COVID 19 Testing  (Send-Out)                      Plan:         Acute upper respiratory infection, unspecified- Clinical picture appears to be viral/arthritis.  Will cover with prednisone 50 mg daily x5 days and Promethazine DM for cough suppression/decongestion.  Delayed antibiotic prescription provided with cefdinir to be used if symptoms extend past 7 to 10 days duration or if overt bacterial signs develop.  COVID testing performed today and pending. .  Infectious precautions given including importance of social distancing, good hygiene, behaviors for visitors/family, safe grocery shopping practices, etc. Call/RTC or present to ED for worsening symptoms such as fever not responding to tylenol and respiratory distress/shortness of breath    LABORATORY:  Labs ordered to be performed today include COVID 19 Testing.            Prescriptions:       [New Rx] predniSONE 50 mg oral tablet [take 1 once daily], #5 (five) tablets, Refills: 0 (zero)       [New Rx] promethazine-DM 6.25-15 mg/5 mL oral Syrup [take 5 milliliters by oral route every 4 hours as needed, not to exceed 30 mL in 24 hours], #118 (one hundred and eighteen) milliliters, Refills: 0 (zero)           Orders:       [id number]  COVID 19 Testing  (Send-Out)                  Charge Capture:          Primary Diagnosis:     J06.9  Acute upper respiratory infection, unspecified           Orders:      [id number]  Office/outpatient visit; established patient, level 3  (In-House)

## 2021-05-18 NOTE — PROGRESS NOTES
Melany Maguire  1960     Office/Outpatient Visit    Visit Date: Fri, Dec 4, 2020 01:37 pm    Provider: Leighton Finch MD (Assistant: Loan Doan MA)    Location: [facility]        Electronically signed by Leighton Finch MD on  2020 05:53:11 PM                             Subjective:        CC: Mrs. Maguire is a 60 year old White female.  She presents with eye redness, SOA h/a since last night, denies cough, muscles aches, fever.          HPI:           Patient complains of unspecified acute conjunctivitis, bilateral.  Mrs. Maguire notes bilateral eye irritation.  Patient denies any eye trauma.  Onset of the described symptoms was yesterday.  Pertinent medical history of allergies.  The patient has NOT been exposed to others with pink eye.      ROS:     CONSTITUTIONAL:  Negative for chills and fever.      EYES:  Negative for blurred vision.      E/N/T:  Negative for ear pain, nasal congestion and sore throat.      RESPIRATORY:  Positive for dyspnea ( MILD FOR THE PAST DAY, HAS A HISTORY OF MILD ASTHMA AND HAS USED AN INHALER IN THE PAST ).   Negative for recent cough.      GASTROINTESTINAL:  Negative for abdominal pain, nausea and vomiting.      MUSCULOSKELETAL:  Negative for arthralgias and myalgias.      NEUROLOGICAL:  Negative for headaches.          Past Medical History / Family History / Social History:         Last Reviewed on 2020 01:53 PM by Leighton Finch    Past Medical History:                 PAST MEDICAL HISTORY             GYNECOLOGICAL HISTORY:     miscarriage 1         CURRENT MEDICAL PROVIDERS:    Neurologist    Obstetrician/Gynecologist         PREVENTIVE HEALTH MAINTENANCE             COLORECTAL CANCER SCREENING: Up to date (colonoscopy q10y; sigmoidoscopy q5y; Cologuard q3y) was last done , Results are in chart; colonoscopy with the following abnormalities noted-- Polyp(s); The next colonoscopy is due  ;;      DENTAL CLEANING: was last done RECOMMENDED 10/15/20     EYE EXAM: RECOMMENDED 10/15/20     MAMMOGRAM: was last done 2019 REPORT NOT IN CHART     PAP SMEAR: was last done 2019 with normal results         Surgical History:         Cholecystectomy    : X 2;     Dilation and Curettage         Family History:         Positive for Congestive Heart Failure ( mother ) and Coronary Artery Disease ( father; sister ).      Positive for Type 2 Diabetes ( mother; sister ).          Social History:     Occupation: [employer]     Marital Status:      Children: 2 children         Tobacco/Alcohol/Supplements:     Last Reviewed on 2020 01:53 PM by Leighton Finch    Tobacco: She has never smoked.          Alcohol:  Does not drink alcohol and never has.      Caffeine:  She admits to consuming caffeine via tea ( 2 servings per day ) and soda ( 2 servings per day ).          Substance Abuse History:     Last Reviewed on 2020 01:53 PM by Leighton Finch    None         Mental Health History:     Last Reviewed on 2018 11:03 AM by Elysia Peterson        Communicable Diseases (eg STDs):     Last Reviewed on 2018 11:03 AM by Elysia Peterson        Current Problems:     Last Reviewed on 2020 01:53 PM by Leighton Finch    Venous insufficiency    History of colonic polyps    Type 2 diabetes mellitus without complications    Essential (primary) hypertension    Peripheral neuropathy secondary to uncontrolled type II diabetes    Shortness of breath        Immunizations:     None        Allergies:     Last Reviewed on 2020 01:53 PM by Leighton Finch    Penicillins:          Current Medications:     Last Reviewed on 2020 01:53 PM by Leighton Finch    vitamin E  [daily]    lisinopriL-hydrochlorothiazide 20-25 mg oral tablet [TAKE ONE TABLET BY MOUTH DAILY]    amLODIPine 10 mg oral tablet [1 tablet daily.]    OTC Glusoimaine Take one  tablet once daily      OTC Tumeric Take one tablet once daily      OTC Vitamin B12 Take one tablet once daily      metFORMIN 500 mg oral tablet [TAKE ONE TABLET BY MOUTH TWICE A DAY]    Vitamin C     diclofenac sodium 75 mg oral tablet, delayed release (enteric coated) [take 1 tablet (75 mg) by oral route 2 times per day]        Objective:        Vitals:         Current: 12/4/2020 1:47:40 PM    Ht:  5 ft, 7 in;  Wt: 305.6 lbs;  BMI: 47.9T: 96.3 F (temporal);  BP: 133/74 mm Hg (right arm, sitting);  P: 87 bpm (right arm (BP Cuff), sitting);  sCr: 0.72 mg/dL;  GFR: 115.47O2 Sat: 94 % (room air)        Exams:     PHYSICAL EXAM:     GENERAL: vital signs recorded - well developed, well nourished;  no apparent distress;     EYES: lids and lacrimal system are normal in appearance; extraocular movements intact; hyperemic bilateral conjunctiva;  PERRL;     E/N/T:  normal EACs, TMs, nasal/oral mucosa, teeth, gingiva, and oropharynx;     NECK: trachea is midline; thyroid is non-palpable;     RESPIRATORY: normal respiratory rate and pattern with no distress; normal breath sounds with no rales, rhonchi, wheezes or rubs;     CARDIOVASCULAR: normal rate; rhythm is regular;  no systolic murmur; trace pedal edema;     LYMPHATIC: no enlargement of cervical or facial nodes; no supraclavicular nodes;     NEUROLOGIC: GROSSLY INTACT         Assessment:         H10.33   Unspecified acute conjunctivitis, bilateral       R06.02   Shortness of breath           ORDERS:         Meds Prescribed:       [New Rx] albuterol sulfate 90 mcg/actuation Inhalation HFA Aerosol Inhaler [2 PUFFS QID PRN], #1 (one) each, Refills: 0 (zero)       [New Rx] Polytrim 10,000 unit- 1 mg/mL ophthalmic (eye) Drops [2 DROPS IN BOTH EYES TID FOR FIVE DAYS], #10 (ten) milliliters, Refills: 0 (zero)         Radiology/Test Orders:       [id number]  Colorectal CA screen results documented and reviewed (PV)  (In-House)              Other Orders:       [id number]  Noninvasive ear or  pulse oximetry for oxygen saturation; single determination  (In-House)                      Plan:         Unspecified acute conjunctivitis, bilateral        FOLLOW-UP: Schedule follow-up appointments on a p.r.n. basis.      ADVISED TO SEE AN EYE DOCTOR IF NOT IMPROVED IN 3-4 DAYS MIPS Vaccines Flu and Pneumonia updated in Shot record Colorectal Cancer Screening is up to date and the results are in the chart           Prescriptions:       [New Rx] Polytrim 10,000 unit- 1 mg/mL ophthalmic (eye) Drops [2 DROPS IN BOTH EYES TID FOR FIVE DAYS], #10 (ten) milliliters, Refills: 0 (zero)           Orders:       [id number]  Colorectal CA screen results documented and reviewed (PV)  (In-House)              Shortness of breath    Observe for now Consider further workup Go to ER if worse.            Prescriptions:       [New Rx] albuterol sulfate 90 mcg/actuation Inhalation HFA Aerosol Inhaler [2 PUFFS QID PRN], #1 (one) each, Refills: 0 (zero)           Orders:       [id number]  Noninvasive ear or pulse oximetry for oxygen saturation; single determination  (In-House)                  Patient Recommendations:        For  Unspecified acute conjunctivitis, bilateral:    Schedule follow-up appointments as needed.  I also recommend ADVISED TO SEE AN EYE DOCTOR IF NOT IMPROVED IN 3-4 DAYS.              Charge Capture:         Primary Diagnosis:     H10.33  Unspecified acute conjunctivitis, bilateral           Orders:      [id number]  Colorectal CA screen results documented and reviewed (PV)  (In-House)              R06.02  Shortness of breath           Orders:      [id number]  Noninvasive ear or pulse oximetry for oxygen saturation; single determination  (In-House)                  ADDENDUMS:      ____________________________________    Addendum: 12/10/2020 08:22 AM - Leighton Finch        ADD [id number]

## 2021-05-18 NOTE — PROGRESS NOTES
Melany Maguire  1960     Office/Outpatient Visit    Visit Date:  08:43 am    Provider: Leighton Finch MD (Assistant: Loan Doan MA)    Location: [facility]        Electronically signed by Leighton Finch MD on  2020 12:35:55 PM                             Subjective:        CC: Mrs. Maguire is a 59 year old White female.  She presents with L arm pain and also having tightness in neck.          HPI:           PHQ-9 Depression Screening: Completed form scanned and in chart; Total Score 3           Concerning pain in left shoulder, she complains of left shoulder pain.  The location of the pain is anterior and inferior.  It radiates to the arm and neck.  The pain initially started 3 weeks ago.  The apparent precipitating event was lifting.  The pain is relieved with NSAIDs.      ROS:     CONSTITUTIONAL:  Negative for chills and fever.      E/N/T:  Negative for diminished hearing, nasal congestion and sore throat.      CARDIOVASCULAR:  Negative for chest pain and palpitations.      RESPIRATORY:  Negative for recent cough and dyspnea.      GASTROINTESTINAL:  Negative for abdominal pain, nausea and vomiting.      MUSCULOSKELETAL:  Negative for arthralgias and myalgias.      NEUROLOGICAL:  Negative for paresthesias and weakness.          Past Medical History / Family History / Social History:         Last Reviewed on 2020 09:00 AM by Leighton Finch    Past Medical History:                 PAST MEDICAL HISTORY             GYNECOLOGICAL HISTORY:     miscarriage 1         CURRENT MEDICAL PROVIDERS:    Neurologist    Obstetrician/Gynecologist         PREVENTIVE HEALTH MAINTENANCE             COLORECTAL CANCER SCREENING: Up to date (colonoscopy q10y; sigmoidoscopy q5y; Cologuard q3y) was last done , Results are in chart; colonoscopy with the following abnormalities noted-- Polyp(s); The next colonoscopy is due  ;;     DENTAL CLEANING:  was last done RECOMMENDED 19     EYE EXAM: RECOMMENDED 19     MAMMOGRAM: was last done 2017         Surgical History:         Cholecystectomy    : X 2;     Dilation and Curettage         Family History:         Positive for Congestive Heart Failure ( mother ) and Coronary Artery Disease ( father; sister ).      Positive for Type 2 Diabetes ( mother; sister ).          Social History:     Occupation: [employer]     Marital Status:      Children: 2 children         Tobacco/Alcohol/Supplements:     Last Reviewed on 2020 09:00 AM by Leighton Finch    Tobacco: She has never smoked.          Alcohol:  Does not drink alcohol and never has.      Caffeine:  She admits to consuming caffeine via tea ( 2 servings per day ) and soda ( 2 servings per day ).          Substance Abuse History:     Last Reviewed on 2020 08:59 AM by Leighton Finch    None         Mental Health History:     Last Reviewed on 2018 11:03 AM by Elysia Peterson        Communicable Diseases (eg STDs):     Last Reviewed on 2018 11:03 AM by Elysia Peterson        Current Problems:     Last Reviewed on 2020 09:00 AM by Leighton Finch    Mixed hyperlipidemia    Elevated triglycerides    HTN    NIDDM    Essential (primary) hypertension    Venous insufficiency    History of colonic polyps    Type 2 diabetes mellitus without complications    Type 2 diabetes mellitus with diabetic polyneuropathy    Other long term (current) drug therapy    Use of high risk medications    Peripheral neuropathy secondary to uncontrolled type II diabetes    Dysuria    Abnormal results of kidney function studies    Elevated white blood cell count, unspecified    Encounter for screening for depression        Immunizations:     None        Allergies:     Last Reviewed on 2020 09:00 AM by Leighton Finch    Penicillins:          Current Medications:     Last Reviewed on 2020  09:00 AM by Leighton Finch    lisinopril-hydrochlorothiazide 20-25 mg oral tablet [Take 1 tablet(s) by mouth daily]    Amlodipine  10 mg oral tablet [1 tablet daily.]    OTC Glusoimaine Take one tablet once daily      OTC Tumeric Take one tablet once daily      OTC Vitamin B12 Take one tablet once daily      metFORMIN 500 mg oral tablet [TAKE ONE TABLET BY MOUTH TWICE A DAY]    gabapentin 300 mg oral capsule [take 1 capsule (300 mg) by oral route 2 times per day prn]    Vitamin C         Objective:        Vitals:         Current: 2/5/2020 8:49:04 AM    Ht:  5 ft, 7 in;  Wt: 301.4 lbs;  BMI: 47.2T: 97.8 F (oral);  BP: 143/76 mm Hg (right arm, sitting);  P: 85 bpm (right arm (BP Cuff), sitting);  sCr: 0.66 mg/dL;  GFR: 126.74        Exams:     PHYSICAL EXAM:     GENERAL: vital signs recorded - well developed, well nourished;  no apparent distress;     NECK: range of motion is normal; trachea is midline; thyroid is non-palpable;     RESPIRATORY: normal respiratory rate and pattern with no distress; normal breath sounds with no rales, rhonchi, wheezes or rubs;     CARDIOVASCULAR: normal rate; rhythm is regular;  no systolic murmur; no edema;     LYMPHATIC: no enlargement of cervical or facial nodes; no supraclavicular nodes;     NEUROLOGIC: mental status: alert and oriented x 3; cranial nerves II-XII grossly intact; LEFT SHOULDER examination: Inspection:  normal;     Palpation: pain elicited in the deltoid and anteriorly;     Neurovascular:  normal;     Muscular Strength:  normal     Range of Motion: full active ROM;         Lab/Test Results:         LABORATORY RESULTS: EKG performed by tls     ECG INTERPRETATION:     normal rate, rhythm, and axis; no ST-T abnormalities;         Assessment:         M25.512   Pain in left shoulder       Z13.31   Encounter for screening for depression           ORDERS:         Meds Prescribed:       [New Rx] diclofenac sodium 75 mg oral tablet, delayed release (enteric coated)  [take 1 tablet (75 mg) by oral route 2 times per day], #30 (thirty) tablets, Refills: 1 (one)         Radiology/Test Orders:       [id number]  Electrocardiogram, routine with at least 12 leads; with interpretation and report  (In-House)            69951FX  Left Radiologic exam, shoulder; comp, 2 views  (Send-Out)              Other Orders:         Depression screen negative  (In-House)                      Plan:         Pain in left shoulder        RADIOLOGY:  I have ordered Shoulder x-ray: left shoulder to be done today.      TESTS/PROCEDURES:  Will proceed with an ECG to be performed/scheduled now.  MIPS Negative Depression Screen     MEDICATIONS: I have prescribed an NSAID.      RECOMMENDATIONS given include: heat therapy and active ROM.      FOLLOW-UP: Schedule follow-up appointments on a p.r.n. basis.      CONSIDER P.T./ORTHO EVAL           Prescriptions:       [New Rx] diclofenac sodium 75 mg oral tablet, delayed release (enteric coated) [take 1 tablet (75 mg) by oral route 2 times per day], #30 (thirty) tablets, Refills: 1 (one)           Orders:       [id number]  Electrocardiogram, routine with at least 12 leads; with interpretation and report  (In-House)            52430AE  Left Radiologic exam, shoulder; comp, 2 views  (Send-Out)              Depression screen negative  (In-House)                  Patient Recommendations:        For  Pain in left shoulder:    Apply heat to the affected area. Begin actively moving the joint in full range of motion; exercise it by flexing and extending and rotating the joint in circles.  Schedule follow-up appointments as needed.              Charge Capture:         Primary Diagnosis:     M25.512  Pain in left shoulder           Orders:      01191  Office/outpatient visit; established patient, level 3  (In-House)            00133  Electrocardiogram, routine with at least 12 leads; with interpretation and report  (In-House)              Depression screen negative   (In-House)              Z13.31  Encounter for screening for depression

## 2021-05-18 NOTE — PROGRESS NOTES
"Melany Maguire  1960     Office/Outpatient Visit    Visit Date: Fri, Apr 24, 2020 02:11 pm    Provider: Leighton Finch MD (Assistant: Claudia You MA)    Location: [facility]        Electronically signed by Leighton Finch MD on  04/24/2020 06:51:53 PM                             Subjective:        CC: CONSENT BY [facility]    [phone]  VIDEO/ DOXIMITYMrs. Ting is a 59 year old White female.  when pt was tested for COVID, pt came back for [facility];         HPI:           Patient presents with type 2 diabetes mellitus without complications.  Specifically, this is type 2, non-insulin requiring diabetes.  Compliance with treatment has been good.  Current meds include an oral hypoglycemic.  Most recent lab results include Hemoglobin A1c:  7.0 (%) (11/04/2019), Foot Exam (Annual):  11/04/2019 (11/04/2019), Microalbumin, Urine, rand:  20.6 (mg/L) (12/14/2018).  HAS HAD DIABETES EDUCATION           Additionally, she presents with history of essential (primary) hypertension.  her current cardiac medication regimen includes a diuretic, an ACE inhibitor, and a calcium channel blocker.  Compliance with treatment has been good.  She did not bring her blood pressure diary, but says that pressures have been well controlled.            Complaint of contact with and (suspected) exposure to other viral communicable diseases..  The symptom began several days ago.  WAS SWABBED FOR COVID-19 AND TOLD THAT SHE HAS \"EBV\" IN HER NOSE     ROS:     CONSTITUTIONAL:  Negative for chills and fever.      E/N/T:  Negative for ear pain, nasal congestion and sore throat.      CARDIOVASCULAR:  Positive for pedal edema ( mild ).   Negative for chest pain or palpitations.      RESPIRATORY:  Negative for recent cough and dyspnea.      GASTROINTESTINAL:  Negative for abdominal pain, anorexia, constipation, diarrhea, nausea and vomiting.      MUSCULOSKELETAL:  Negative for myalgias.      NEUROLOGICAL:  " Positive for paresthesia ( bilateral lower extremity; GABAPENTIN HELPING ).   Negative for headaches or weakness.          Past Medical History / Family History / Social History:         Last Reviewed on 2020 06:29 PM by Leighton Finch    Past Medical History:                 PAST MEDICAL HISTORY             GYNECOLOGICAL HISTORY:     miscarriage 1         CURRENT MEDICAL PROVIDERS:    Neurologist    Obstetrician/Gynecologist         PREVENTIVE HEALTH MAINTENANCE             COLORECTAL CANCER SCREENING: Up to date (colonoscopy q10y; sigmoidoscopy q5y; Cologuard q3y) was last done 2017, Results are in chart; colonoscopy with the following abnormalities noted-- Polyp(s); The next colonoscopy is due  ;;     DENTAL CLEANING: was last done RECOMMENDED 19     EYE EXAM: RECOMMENDED 19     MAMMOGRAM: was last done 2017         Surgical History:         Cholecystectomy    : X 2;     Dilation and Curettage         Family History:         Positive for Congestive Heart Failure ( mother ) and Coronary Artery Disease ( father; sister ).      Positive for Type 2 Diabetes ( mother; sister ).          Social History:     Occupation: [employer]     Marital Status:      Children: 2 children         Tobacco/Alcohol/Supplements:     Last Reviewed on 2020 06:29 PM by Leighton Finch    Tobacco: She has never smoked.          Alcohol:  Does not drink alcohol and never has.      Caffeine:  She admits to consuming caffeine via tea ( 2 servings per day ) and soda ( 2 servings per day ).          Substance Abuse History:     Last Reviewed on 2020 06:29 PM by Leighton Finch    None         Mental Health History:     Last Reviewed on 2018 11:03 AM by Elysia Peterson        Communicable Diseases (eg STDs):     Last Reviewed on 2018 11:03 AM by Elysia Peterson        Current Problems:     Last Reviewed on 2020 06:29 PM by Leighton Finch  Rogelio    Essential (primary) hypertension    Venous insufficiency    History of colonic polyps    Type 2 diabetes mellitus without complications    Other long term (current) drug therapy    Peripheral neuropathy secondary to uncontrolled type II diabetes        Immunizations:     None        Allergies:     Last Reviewed on 4/24/2020 06:29 PM by Leighton Finch    Penicillins:          Current Medications:     Last Reviewed on 4/24/2020 06:29 PM by Leighton Finch    lisinopriL-hydrochlorothiazide 20-25 mg oral tablet [TAKE ONE TABLET BY MOUTH DAILY]    Amlodipine  10 mg oral tablet [1 tablet daily.]    OTC Glusoimaine Take one tablet once daily      OTC Tumeric Take one tablet once daily      OTC Vitamin B12 Take one tablet once daily      metFORMIN 500 mg oral tablet [TAKE ONE TABLET BY MOUTH TWICE A DAY]    Vitamin C     diclofenac sodium 75 mg oral tablet, delayed release (enteric coated) [take 1 tablet (75 mg) by oral route 2 times per day]    vitamin E  [daily]        Objective:        Exams:     PHYSICAL EXAM:     GENERAL: vital signs recorded - well developed, well nourished;  no apparent distress;     RESPIRATORY: normal respiratory rate and pattern with no distress;     PSYCHIATRIC:  appropriate affect and demeanor; normal speech pattern; grossly normal memory;         Assessment:         E11.9   Type 2 diabetes mellitus without complications       I10   Essential (primary) hypertension       Z20.828   Contact with and (suspected) exposure to other viral communicable diseases           ORDERS:         Radiology/Test Orders:       3017F  Colorectal CA screen results documented and reviewed (PV)  (In-House)                      Plan:         Type 2 diabetes mellitus without complications        MEDICATIONS: (no change to current medication regimen)     RECOMMENDATIONS: adherance to Low carb, NCS diet diet.      FOLLOW-UP: Schedule a follow-up visit in 6 months.  MIPS Vaccines Flu and  Pneumonia updated in Shot record Colorectal Cancer Screening is up to date and the results are in the chart Telehealth: Verbal consent obtained for visit to occur via televideo conferencing           Orders:       3017F  Colorectal CA screen results documented and reviewed (PV)  (In-House)              Essential (primary) hypertension        MEDICATIONS: (no change to current medication regimen)         Contact with and (suspected) exposure to other viral communicable diseases        [facility] BRING HER TEST RESULTS FOR US TO REVIEW             Patient Recommendations:        For  Type 2 diabetes mellitus without complications:    Follow a diabetic diet as directed.  Schedule a follow-up visit in 6 months.          For  Contact with and (suspected) exposure to other viral communicable diseases:    I also recommend [facility] BRING HER TEST RESULTS FOR US TO REVIEW.              Charge Capture:         Primary Diagnosis:     E11.9  Type 2 diabetes mellitus without complications           Orders:      01337  Office/outpatient visit; established patient, level 3  (In-House)            3017F  Colorectal CA screen results documented and reviewed (PV)  (In-House)              I10  Essential (primary) hypertension     Z20.828  Contact with and (suspected) exposure to other viral communicable diseases

## 2021-05-28 ENCOUNTER — HOSPITAL ENCOUNTER (OUTPATIENT)
Dept: OTHER | Facility: HOSPITAL | Age: 61
Discharge: HOME | End: 2021-05-28
Attending: PHYSICIAN ASSISTANT

## 2021-06-02 ENCOUNTER — OFFICE VISIT (OUTPATIENT)
Dept: ORTHOPEDIC SURGERY | Facility: CLINIC | Age: 61
End: 2021-06-02

## 2021-06-02 VITALS — TEMPERATURE: 97.2 F | BODY MASS INDEX: 45.99 KG/M2 | WEIGHT: 293 LBS | HEIGHT: 67 IN

## 2021-06-02 DIAGNOSIS — M19.012 ARTHRITIS OF LEFT ACROMIOCLAVICULAR JOINT: ICD-10-CM

## 2021-06-02 DIAGNOSIS — M75.102 TEAR OF LEFT SUPRASPINATUS TENDON: Primary | ICD-10-CM

## 2021-06-02 PROCEDURE — 99214 OFFICE O/P EST MOD 30 MIN: CPT | Performed by: PHYSICIAN ASSISTANT

## 2021-06-02 NOTE — PROGRESS NOTES
"Chief Complaint  Follow-up and Pain of the Left Shoulder and Results (MRI LEFT SHOULDER)    Subjective    History of Present Illness      Melany Maguire is a 60 y.o. female who presents to [facility] for follow-up on left shoulder. At last visit on 4/27/2021 administered an intra-articular steroid injection into the shoulder but she expressed interest in wanting to proceed with an MRI, as we had previously discussed, since the steroid injections are no longer providing relief beyond 2 weeks. I initially began seeing her for this complaint in October 2020, at which point she was not interested in MRI. Her symptoms began around January 2020 and she denied injury. Today she presents to follow-up on those results. Her shoulder feels the same as at last visit.         Objective   Vital Signs:   Temp 97.2 °F (36.2 °C)   Ht 170.2 cm (67\")   Wt 136 kg (300 lb)   BMI 46.99 kg/m²     Physical Exam  Musculoskeletal:      Comments: See detailed ortho exam from prior visit   Neurological:      Mental Status: She is alert and oriented to person, place, and time. Mental status is at baseline.   Psychiatric:         Mood and Affect: Mood normal.         Behavior: Behavior normal.         Thought Content: Thought content normal.         Judgment: Judgment normal.       Ortho Exam   LEFT shoulder:  Positive for pain and tenderness with palpation over the subcromial bursa.   Positive for signs of impingement with internal and external rotation.   Forward flexion is 0-110 degrees, abduction is 0-100 degrees, external rotation is 0-50 degrees.   Rotator cuff function is fairly well preserved except impingement at 90 degrees.   Lewis's sign is positive. Neer sign is positive.   Sulcus sign is negative. Drop arm sign is negative.   Apprehension sign is negative.   Axillary nerve function is well preserved. Radial artery pulses are palpable.   There is no evidence of multidirectional instability. "   The pain level is 6.        Result Review :   Radiologic studies - see below for interpretation  Reviewed MRI report of Left shoulder, performed at  [facility] on 5/28/21, summary of impression below:  · Supraspinatus tendon shows full-thickness, full width or near full width tear from the footplate with 2 cm medial retraction of the bulk of the tendon fibers and only a few of the far posterior tendon fibers potentially remaining intact at the greater tuberosity  · All other rotator cuff tendons are intact  · There is mild fatty atrophy of the rotator cuff muscles  · Long head biceps tendon normal  · AC joint shows mild age-appropriate hypertrophic degenerative changes with associated mass-effect on supraspinatus space  · Acromion is type II with lateral downsloping  · Small anterior/inferior acromial enthesophyte with mass-effect on the supraspinatus space and mild fluid in the subacromial/subdeltoid bursa communicating through the full-thickness rotator cuff tear.  · Articular cartilage of the glenohumeral joint is intact and the joint capsule is in normal limits  · Lesser tuberosity shows nonspecific subcortical cystic change  · Distal clavicle and acromion shows degenerative subcortical cystic change  · Degenerative findings of superior labrum      Assessment   Assessment and Plan    Problem List Items Addressed This Visit        Musculoskeletal and Injuries    Tear of left supraspinatus tendon - Primary    Relevant Orders    External [facility] Surgical/Procedural Request    Urinalysis With Culture If Indicated -    Arthritis of left acromioclavicular joint    Relevant Orders    External [facility] Surgical/Procedural Request    Urinalysis With Culture If Indicated -          Follow Up   · Discussion of any imaging in detail. Discussion of orthopaedic goals.  · Ice, heat, and/or modalities as beneficial  The nature of the proposed surgery reviewed with the patient including  risks, benefits, rehabilitation, recovery timeframe, and outcome expectations. Patient has been diagnosed with a rotator cuff tear.  These tears can range from partial tears to complete tears of the muscle and/or tendon. They can also be categorized in size by width as being small, medium or large.  Diagnosis is confirmed with MRI or CT/arthrogram.  Management of these tears can be conservative with injections, physical therapy, activity modification and use of NSAIDS as needed.  Surgery is the only way to actually fix these tears, as they will not heal or repair on their own.  These tears can usually be repaired with arthroscopic surgery, but occasionally open procedures are necessary.  Many factors can influence the outcomes. First, larger tears have a higher chance of failure from a healing perspective, although pain may improve nonetheless, potentially longer recovery and sometimes, depending on the quality of the tissue and the length of time the tear has been present, may not be repairable at all.  If the tear has been going on for a long time, the muscle can actually change to fatty tissue, and no longer act as muscle.  This can have a direct effect on not only the ability to repair the tear but also the outcome from the surgery itself both from a healing and functional perspective.  Therefore, the decision to proceed with surgery does have a time factor which can affect the quality of the tissue and reparability but also the potential for the tear to increase in size.  The longer you wait to repair the torn tendon there can be decreased potential for a successful outcome.  · Risks of surgery have been discussed with the patient in detail.  These risks include but are not limited to possibility of infection, DVT, continued pain, continued progression of arthritis, use of allograft tissue, limited range of motion and strength and further surgical intervention.  Patient understands that the surgery will benefit  mechanical symptoms of pain and instability but may not help with symptoms of arthritis.    · Patient is encouraged to call or return for any issues or concerns.  • Patient was given instructions and counseling regarding her condition or for health maintenance advice. Please see specific information pulled into the AVS if appropriate.        Eddie Overton PA-C   Date of Encounter: 6/2/2021   Electronically signed by Eddie Overton PA-C, 06/04/21, 6:30 AM EDT.     EMR Dragon/Transcription disclaimer:  Much of this encounter note is an electronic transcription/translation of spoken language to printed text. The electronic translation of spoken language may permit erroneous, or at times, nonsensical words or phrases to be inadvertently transcribed; Although I have reviewed the note for such errors, some may still exist.

## 2021-06-04 PROBLEM — M19.012 ARTHRITIS OF LEFT ACROMIOCLAVICULAR JOINT: Status: ACTIVE | Noted: 2021-06-04

## 2021-06-04 PROBLEM — M75.102 TEAR OF LEFT SUPRASPINATUS TENDON: Status: ACTIVE | Noted: 2021-06-04

## 2021-06-04 NOTE — PROGRESS NOTES
Yes that will be just fine.  Go ahead and put her on the schedule for a scope and a mini open repair thank you

## 2021-06-10 RX ORDER — DICLOFENAC SODIUM 75 MG/1
TABLET, DELAYED RELEASE ORAL
Qty: 180 TABLET | Refills: 0 | Status: SHIPPED | OUTPATIENT
Start: 2021-06-10 | End: 2021-08-20

## 2021-06-28 ENCOUNTER — OUTSIDE FACILITY SERVICE (OUTPATIENT)
Dept: ORTHOPEDIC SURGERY | Facility: CLINIC | Age: 61
End: 2021-06-28

## 2021-06-28 PROCEDURE — 23412 REPAIR ROTATOR CUFF CHRONIC: CPT | Performed by: ORTHOPAEDIC SURGERY

## 2021-06-28 PROCEDURE — 29824 SHO ARTHRS SRG DSTL CLAVICLC: CPT | Performed by: ORTHOPAEDIC SURGERY

## 2021-07-01 VITALS
HEART RATE: 101 BPM | SYSTOLIC BLOOD PRESSURE: 146 MMHG | WEIGHT: 293 LBS | TEMPERATURE: 98.9 F | DIASTOLIC BLOOD PRESSURE: 90 MMHG | HEIGHT: 67 IN | BODY MASS INDEX: 45.99 KG/M2

## 2021-07-01 VITALS
SYSTOLIC BLOOD PRESSURE: 152 MMHG | HEART RATE: 79 BPM | HEIGHT: 67 IN | WEIGHT: 293 LBS | DIASTOLIC BLOOD PRESSURE: 87 MMHG | TEMPERATURE: 98.2 F | BODY MASS INDEX: 45.99 KG/M2

## 2021-07-01 VITALS
HEART RATE: 100 BPM | BODY MASS INDEX: 45.99 KG/M2 | SYSTOLIC BLOOD PRESSURE: 163 MMHG | HEIGHT: 67 IN | TEMPERATURE: 98.3 F | WEIGHT: 293 LBS | DIASTOLIC BLOOD PRESSURE: 65 MMHG

## 2021-07-01 VITALS
HEART RATE: 83 BPM | BODY MASS INDEX: 45.99 KG/M2 | HEIGHT: 67 IN | WEIGHT: 293 LBS | SYSTOLIC BLOOD PRESSURE: 150 MMHG | DIASTOLIC BLOOD PRESSURE: 93 MMHG | TEMPERATURE: 97.4 F

## 2021-07-01 VITALS
TEMPERATURE: 98.2 F | HEART RATE: 88 BPM | SYSTOLIC BLOOD PRESSURE: 134 MMHG | HEIGHT: 67 IN | DIASTOLIC BLOOD PRESSURE: 77 MMHG | WEIGHT: 293 LBS | BODY MASS INDEX: 45.99 KG/M2

## 2021-07-02 ENCOUNTER — OFFICE VISIT (OUTPATIENT)
Dept: ORTHOPEDIC SURGERY | Facility: CLINIC | Age: 61
End: 2021-07-02

## 2021-07-02 VITALS
DIASTOLIC BLOOD PRESSURE: 76 MMHG | BODY MASS INDEX: 45.99 KG/M2 | HEIGHT: 67 IN | HEART RATE: 85 BPM | WEIGHT: 293 LBS | TEMPERATURE: 97.8 F | SYSTOLIC BLOOD PRESSURE: 143 MMHG

## 2021-07-02 VITALS
DIASTOLIC BLOOD PRESSURE: 96 MMHG | OXYGEN SATURATION: 95 % | SYSTOLIC BLOOD PRESSURE: 147 MMHG | WEIGHT: 293 LBS | TEMPERATURE: 98.3 F | HEIGHT: 67 IN | BODY MASS INDEX: 45.99 KG/M2 | HEART RATE: 99 BPM

## 2021-07-02 VITALS
HEART RATE: 87 BPM | BODY MASS INDEX: 45.99 KG/M2 | DIASTOLIC BLOOD PRESSURE: 74 MMHG | HEIGHT: 67 IN | SYSTOLIC BLOOD PRESSURE: 133 MMHG | OXYGEN SATURATION: 94 % | WEIGHT: 293 LBS | TEMPERATURE: 96.3 F

## 2021-07-02 VITALS
SYSTOLIC BLOOD PRESSURE: 152 MMHG | HEIGHT: 67 IN | DIASTOLIC BLOOD PRESSURE: 89 MMHG | WEIGHT: 293 LBS | HEART RATE: 91 BPM | BODY MASS INDEX: 45.99 KG/M2 | TEMPERATURE: 97.4 F

## 2021-07-02 VITALS — HEIGHT: 67 IN | TEMPERATURE: 98 F | BODY MASS INDEX: 45.99 KG/M2 | WEIGHT: 293 LBS

## 2021-07-02 DIAGNOSIS — Z98.890 S/P LEFT ROTATOR CUFF REPAIR: Primary | ICD-10-CM

## 2021-07-02 PROCEDURE — 99024 POSTOP FOLLOW-UP VISIT: CPT | Performed by: PHYSICIAN ASSISTANT

## 2021-07-15 RX ORDER — LISINOPRIL AND HYDROCHLOROTHIAZIDE 25; 20 MG/1; MG/1
TABLET ORAL
Qty: 90 TABLET | Refills: 3 | Status: SHIPPED | OUTPATIENT
Start: 2021-07-15 | End: 2022-07-11 | Stop reason: SDUPTHER

## 2021-07-15 RX ORDER — AMLODIPINE BESYLATE 10 MG/1
TABLET ORAL
Qty: 90 TABLET | Refills: 3 | Status: SHIPPED | OUTPATIENT
Start: 2021-07-15 | End: 2022-07-11 | Stop reason: SDUPTHER

## 2021-08-05 ENCOUNTER — OFFICE VISIT (OUTPATIENT)
Dept: ORTHOPEDIC SURGERY | Facility: CLINIC | Age: 61
End: 2021-08-05

## 2021-08-05 VITALS — HEIGHT: 67 IN | WEIGHT: 293 LBS | TEMPERATURE: 98 F | BODY MASS INDEX: 45.99 KG/M2

## 2021-08-05 DIAGNOSIS — Z98.890 S/P LEFT ROTATOR CUFF REPAIR: Primary | ICD-10-CM

## 2021-08-05 PROCEDURE — 99024 POSTOP FOLLOW-UP VISIT: CPT | Performed by: ORTHOPAEDIC SURGERY

## 2021-08-05 NOTE — PROGRESS NOTES
OTHER POST OP GLOBAL     NAME: Melany Maguire  ?  : 1960  ?  MRN: [id number]    Chief Complaint   Patient presents with   • Left Shoulder - Follow-up, Pain   • Post-op     ?  Date of surgery: 2021    HPI:   Patient returns today for 6 week follow up of left rotator cuff repair. Arthroscopic portals healing nicely/as expected with no signs of infection. Patient reports doing well with no unusual complaints. Appears to be progressing appropriately.  She is making excellent progress postoperatively and is using very minimal amounts of pain medication at this point.  She is ready to commence a formal physical therapy program at this point.      Ortho Exam:   Left shoulder. The patient is status-post rotator cuff repair 6 week(s) . Incision is clean and healing well. Arthroscopic portals are clean. Appropriate amounts of swelling and bruising are noted. The patients pain is well controlled. The passive range of motion is abduction 0-90 degrees, flexion 0-90 degrees. Axillary nerve function is well preserved. Radial artery pulses are palpable.      Diagnostic Studies:  no diagnostic testing performed this visit      Assessment:  Diagnoses and all orders for this visit:    1. S/P left rotator cuff repair (Primary)  -     Ambulatory Referral to Physical Therapy Evaluate and treat, POST OP          Plan   • Incision care  • Continue ice as needed  • Aggressive ROM  • Stretching and strengthening exercises  • Tylenol 500-1000mg by mouth every 6 hours as needed for pain   • Fall precautions  • Follow up in 6 week(s)     Date of encounter: 2021  Stalin Bains MD

## 2021-08-05 NOTE — PROGRESS NOTES
"Chief Complaint  Follow-up and Pain of the Left Shoulder and Post-op    Subjective    History of Present Illness {CC  Problem List  Visit Diagnosis   Encounters  Notes  Medications  Labs  Result Review Imaging  Media :23}     Melany Maguire is a 60 y.o. female who presents to [facility] for   History of Present Illness   Pain Location: {AS Body Parts:65278}  Radiation: {asradiationpain:20724}  Quality: {asquality:50379}  Intensity/Severity: {asseveritypain:64437}  Duration: {Time:62453}  Progression of symptoms: {Stucker yes no:78537}  Onset quality: {asonsetquality:00581}  Timing: {astimin}  Aggravating Factors: {AS Aggravating Factors Ortho:19729}  Alleviating Factors: {AS Alleviating Factors:51378}  Previous Episodes: {aspreviousepisodes:31573}  Associated Symptoms: {asassociatedsymptoms:07086}  ADLs Affected: {ADL ASMEH:28209}  Previous Treatment: {AS previous treatment:94945}       Objective   Vital Signs:   Temp 98 °F (36.7 °C)   Ht 170.2 cm (67.01\")   Wt 136 kg (300 lb)   BMI 46.98 kg/m²     Physical Exam  Physical Exam  Vitals signs and nursing note reviewed.   Constitutional:       Appearance: Normal appearance.   Pulmonary:      Effort: Pulmonary effort is normal.   Skin:     General: Skin is warm and dry.      Capillary Refill: Capillary refill takes less than 2 seconds.   Neurological:      General: No focal deficit present.      Mental Status: He is alert and oriented to person, place, and time. Mental status is at baseline.   Psychiatric:         Mood and Affect: Mood normal.         Behavior: Behavior normal.         Thought Content: Thought content normal.         Judgment: Judgment normal.     Ortho Exam   {Musculoskeletal Exams:25066}    {Post Op Total Joint Arthroplasty Exam:71577}    {Post Op Detailed Exam:98672}        Result Review :{ Labs  Result Review  Imaging  Med Tab  Media :23}   The following data was reviewed by: Leonela" TOBY Loyd on 08/05/2021:  {Data reviewed (optional):94862}  {Diagnostics options AS:66201}            Procedures           Assessment   Assessment and Plan {CC Problem List  Visit Diagnosis  ROS  Review (Popup)  Health Maintenance  Quality  BestPractice  Medications  SmartSets  SnapShot Encounters  Media :23}   There are no diagnoses linked to this encounter.    {Time Spent (Optional):55705}  Follow Up {Instructions Charge Capture  Follow-up Communications :23}  · Compression/brace  · Rest, ice, compression, and elevation (RICE) therapy  · Stretching and strengthening exercises  · OTC {OTC pain:43169}  · Follow up in *** {WEEKS/MONTHS:85760}  • Patient was given instructions and counseling regarding her condition or for health maintenance advice. Please see specific information pulled into the AVS if appropriate.     Leonela Loyd MA   Date of Encounter: 8/5/2021   Electronically signed by Leonela Loyd MA, 08/05/21, 1:18 PM EDT.     EMR Dragon/Transcription disclaimer:  Much of this encounter note is an electronic transcription/translation of spoken language to printed text. The electronic translation of spoken language may permit erroneous, or at times, nonsensical words or phrases to be inadvertently transcribed; Although I have reviewed the note for such errors, some may still exist.

## 2021-08-13 ENCOUNTER — TREATMENT (OUTPATIENT)
Dept: PHYSICAL THERAPY | Facility: CLINIC | Age: 61
End: 2021-08-13

## 2021-08-13 DIAGNOSIS — Z98.890 S/P LEFT ROTATOR CUFF REPAIR: Primary | ICD-10-CM

## 2021-08-13 DIAGNOSIS — M25.512 ACUTE PAIN OF LEFT SHOULDER: ICD-10-CM

## 2021-08-13 PROCEDURE — 97161 PT EVAL LOW COMPLEX 20 MIN: CPT | Performed by: PHYSICAL THERAPIST

## 2021-08-13 NOTE — PROGRESS NOTES
Physical Therapy Initial Evaluation and Plan of Care      Patient: Melany Maguire   : 1960  Diagnosis/ICD-10 Code:  S/P left rotator cuff repair [Z98.890]  Referring practitioner: Stalin Bains MD  Date of Initial Visit: 2021  Today's Date: 2021   Patient seen for 1 sessions    Recertification: 9/3/2021   Next MD appt: 2021.         Subjective Questionnaire: QuickDASH: 38.64      Subjective Evaluation    History of Present Illness  Date of surgery: 2021    Subjective comment: Pt presents S/P L RCR DCE  SAD 21. Relates Dr f/u last week went well. Min c/o pain. States using R arm slightly, not trying to elevate.  Sleeping in recliner currently. Sling 50% of the time as needed.  Ice PRN.  No HEP. Quality of life: good    Pain  Current pain ratin  Location: L shoulder  Relieving factors: ice    Social Support  Lives with: family.    Hand dominance: right    Patient Goals  Patient goals for therapy: increased strength, independence with ADLs/IADLs, return to sport/leisure activities and decreased pain             Objective          Active Range of Motion   Left Shoulder   Normal active range of motion    Right Shoulder   Flexion: 40 degrees      General Comments     Shoulder Comments   Incision and portals healed well, slight adhesions.    TTp over acromion, bicep muscle belly, ant shoulder. No bruising noted.    R hand, wrist, elbow ROM WNL.  Mild c/o sorenss in bicep with end range flexion.      R shoulder: passive ROM 85 flexion , active 40, active IR to lateral hip, able to reach R ear with c/o    MMT: arm at side, elbow flexed 90, isometric:  Ext 4,   Add 4  IR 4-  Flex/ abd/ ER not tested               Assessment & Plan     Assessment  Impairments: abnormal or restricted ROM and lacks appropriate home exercise program  Other impairment: limited use of R UE  Assessment details: Pt presents 6.5 weeks S/P R RCR SAD DCE 21, arrives in sling (maladjusted), min c/o pain,  well healed incisions, mild adhesions, TTP, limited PROM/ AROM, strength deficit in R shoulder, limitations with ADL performance, lack of HEP.     Pt would benefit from skilled physical therapy intervention to address the above mentioned deficits.     Time was spent reviewing anatomy, pathology, surgical procedure, healing process, appropriate progression expectations, precautions, what to expect in therapy.     Pt was started on initial exercises to begin over the weekend.    Pt was given WHEP.  Pt related understanding of instructions, precautions and progression parameters.      Barriers to therapy: none noted at this time  Prognosis: good  Functional Limitations: carrying objects, lifting, sleeping, pushing, moving in bed, reaching behind back and reaching overhead  Goals  Plan Goals: STG:  Pt will demonstrate/ report:   1) knowledge and compliance with RCR  Restrictions.  2) PROM R shoulder flexion 120, abd 90 with pulley.  3) appropriate scapular positioning/stabilization when initiating R shoulder AROM.  4) R elbow flexion 4/5, no c/o pain.  5) improve score on Quick Dash by 10%  6) Pt will be I in HEP each visit.    LTG:   Pt will demonstrate/ report:   1) R shoulder PROM WFL, all planes, min/no c/o pain.  2) AROM R shoulder: flexion 120+, abd 120+, ER to behind head+, IR to beltline+, min/no c/o pain.   3) Maintain proper R scapular positioning/ stabilization with use of R UE.   4) resumption of ADLS/ hobbies with min / no R shoulder limitations.  5) sleeping 75-90% or more of the night with min interruptions due to R shoulder pain.   6) be I in final HEP and progression parameters to perform after formal physical therapy has been discontinued.  7) improve Quick Dash score 20% from initial evaluation.       Plan  Therapy options: will be seen for skilled physical therapy services  Other planned modality interventions: as indicated to benefit the pt  Planned therapy interventions: body mechanics training,  flexibility, functional ROM exercises, home exercise program, joint mobilization, manual therapy, neuromuscular re-education, postural training, soft tissue mobilization, strengthening, stretching and therapeutic activities  Other planned therapy interventions: education  Duration in visits: 8  Plan details: Continue therapy involving education, stretching, strengthening, stabilization training, modalities as indicated, manual therapy techniques, progressive HEP instruction.    Progress per recommended protocol as tolerated/ indicated.         Visit Diagnoses:    ICD-10-CM ICD-9-CM   1. S/P left rotator cuff repair  Z98.890 V45.89   2. Acute pain of left shoulder  M25.512 719.41       Timed:  Manual Therapy:         mins  83731;  Therapeutic Exercise:         mins  34158;      Neuromuscular Trena:        mins  79726;    Therapeutic Activity:          mins  65163;     Gait Training:           mins  32289;     Ultrasound:          mins  06450;    Paraffin:        mins  30834;  Electrical Stimulation:         mins  86269 ( );    Untimed:  Electrical Stimulation:         mins  63454 ( );  Mechanical Traction:        mins  14747;     Timed Treatment:      mins   Total Treatment:     45   mins    PT SIGNATURE: Kiah Prado PT  DATE TREATMENT INITIATED: 8/13/2021    Initial Certification  Certification Period: 11/11/2021  I certify that the therapy services are furnished while this patient is under my care.  The services outlined above are required by this patient, and will be reviewed every 90 days.     PHYSICIAN: Stalin Bains MD      DATE:     Please sign and return via fax to [phone].. Thank you, [facility].        This document has been electronically signed by Kiah Prado PT on August 13, 2021 11:51 EDT

## 2021-08-19 ENCOUNTER — TREATMENT (OUTPATIENT)
Dept: PHYSICAL THERAPY | Facility: CLINIC | Age: 61
End: 2021-08-19

## 2021-08-19 DIAGNOSIS — M75.102 TEAR OF LEFT SUPRASPINATUS TENDON: ICD-10-CM

## 2021-08-19 DIAGNOSIS — Z98.890 S/P LEFT ROTATOR CUFF REPAIR: Primary | ICD-10-CM

## 2021-08-19 DIAGNOSIS — M19.012 ARTHRITIS OF LEFT ACROMIOCLAVICULAR JOINT: ICD-10-CM

## 2021-08-19 PROCEDURE — 97112 NEUROMUSCULAR REEDUCATION: CPT | Performed by: PHYSICAL THERAPIST

## 2021-08-19 PROCEDURE — 97140 MANUAL THERAPY 1/> REGIONS: CPT | Performed by: PHYSICAL THERAPIST

## 2021-08-19 NOTE — PROGRESS NOTES
Physical Therapy Daily Progress Note      Patient: Melany Maguire   : 1960  Referring practitioner: Stalin Bains MD  Date of Initial Visit: Type: THERAPY  Noted: 2021  Today's Date: 2021  Patient seen for 2 sessions    DOS: 21  Recertification: 9/3/2021   Next MD appt: 2021.       Melany Maguire reports: mild soreness from overdoing cleaning tasks      Subjective Evaluation    History of Present Illness    Subjective comment: States L shoulder feels good. States likely overdid it cleanign the house and has ant shoulder soreness. No c/o with HEP.  Relates understands precautions. Pain  Current pain rating: 3  Location: ant shoulder           Objective   See Exercise, Manual, and Modality Logs for complete treatment.       Assessment & Plan     Assessment  Assessment details: Nice tolerance to PROM and PNF to scapular region. Tender sub scap region  Due to prolonged immobilization., Good scapular control.     Pt was given WHEP of today's exercises.  Pt related understanding of precautions, progression parameters. Questions were addressed as they arose.   Prognosis: good    Goals  Plan Goals: Goals: STG:  Pt will demonstrate/ report:   1) knowledge and compliance with RCR  Restrictions.  2) PROM R shoulder flexion 120, abd 90 with pulley.  3) appropriate scapular positioning/stabilization when initiating R shoulder AROM.  4) R elbow flexion 4/5, no c/o pain.  5) improve score on Quick Dash by 10%  6) Pt will be I in HEP each visit.    LTG:   Pt will demonstrate/ report:   1) R shoulder PROM WFL, all planes, min/no c/o pain.  2) AROM R shoulder: flexion 120+, abd 120+, ER to behind head+, IR to beltline+, min/no c/o pain.   3) Maintain proper R scapular positioning/ stabilization with use of R UE.   4) resumption of ADLS/ hobbies with min / no R shoulder limitations.  5) sleeping 75-90% or more of the night with min interruptions due to R shoulder pain.   6) be I in final HEP and  progression parameters to perform after formal physical therapy has been discontinued.  7) improve Quick Dash score 20% from initial evaluation.     Plan  Plan details: Continue therapy as planned, progress L shoulder as indicated. Progress HEP as indicated. DOS 6/28/21        Visit Diagnoses:    ICD-10-CM ICD-9-CM   1. S/P left rotator cuff repair  Z98.890 V45.89   2. Tear of left supraspinatus tendon  M75.102 840.6   3. Arthritis of left acromioclavicular joint  M19.012 716.91       Progress per POC.           Timed:  Manual Therapy:    15     mins  68002;  Therapeutic Exercise:         mins  13544;     Neuromuscular Trena:    15    mins  12359;    Therapeutic Activity:          mins  96227;     Gait Training:           mins  49533;     Ultrasound:          mins  01369;    Paraffin:        mins  44378;  Electrical Stimulation:         mins  63902 ( );    Untimed:  Electrical Stimulation:         mins  72643 ( );  Mechanical Traction:         mins  34501;     Timed Treatment:   30   mins   Total Treatment:     35   mins    Kiah Prado PT  Physical Therapist        This document has been electronically signed by Kiah Prado PT on August 19, 2021 17:09 EDT

## 2021-08-20 ENCOUNTER — LAB (OUTPATIENT)
Dept: LAB | Facility: HOSPITAL | Age: 61
End: 2021-08-20

## 2021-08-20 ENCOUNTER — OFFICE VISIT (OUTPATIENT)
Dept: FAMILY MEDICINE CLINIC | Age: 61
End: 2021-08-20

## 2021-08-20 VITALS
BODY MASS INDEX: 45.99 KG/M2 | HEART RATE: 87 BPM | DIASTOLIC BLOOD PRESSURE: 78 MMHG | SYSTOLIC BLOOD PRESSURE: 135 MMHG | HEIGHT: 67 IN | WEIGHT: 293 LBS

## 2021-08-20 DIAGNOSIS — E11.42 DIABETIC PERIPHERAL NEUROPATHY (HCC): ICD-10-CM

## 2021-08-20 DIAGNOSIS — E11.9 TYPE 2 DIABETES MELLITUS TREATED WITHOUT INSULIN (HCC): ICD-10-CM

## 2021-08-20 DIAGNOSIS — I87.2 VENOUS INSUFFICIENCY: ICD-10-CM

## 2021-08-20 DIAGNOSIS — I10 ESSENTIAL HYPERTENSION: Primary | ICD-10-CM

## 2021-08-20 DIAGNOSIS — M75.102 TEAR OF LEFT SUPRASPINATUS TENDON: ICD-10-CM

## 2021-08-20 DIAGNOSIS — M19.012 ARTHRITIS OF LEFT ACROMIOCLAVICULAR JOINT: ICD-10-CM

## 2021-08-20 DIAGNOSIS — Z12.39 ENCOUNTER FOR BREAST CANCER SCREENING USING NON-MAMMOGRAM MODALITY: ICD-10-CM

## 2021-08-20 PROBLEM — M75.42 IMPINGEMENT SYNDROME OF LEFT SHOULDER: Status: RESOLVED | Noted: 2020-10-27 | Resolved: 2021-08-20

## 2021-08-20 PROBLEM — Z98.890 S/P LEFT ROTATOR CUFF REPAIR: Status: RESOLVED | Noted: 2021-07-02 | Resolved: 2021-08-20

## 2021-08-20 PROBLEM — M25.512 LEFT SHOULDER PAIN: Status: RESOLVED | Noted: 2020-10-27 | Resolved: 2021-08-20

## 2021-08-20 LAB
ALBUMIN SERPL-MCNC: 4.4 G/DL (ref 3.5–5.2)
ALBUMIN/GLOB SERPL: 1.4 G/DL
ALP SERPL-CCNC: 67 U/L (ref 39–117)
ALT SERPL W P-5'-P-CCNC: 32 U/L (ref 1–33)
ANION GAP SERPL CALCULATED.3IONS-SCNC: 10 MMOL/L (ref 5–15)
AST SERPL-CCNC: 29 U/L (ref 1–32)
BILIRUB SERPL-MCNC: 0.2 MG/DL (ref 0–1.2)
BILIRUB UR QL STRIP: NEGATIVE
BUN SERPL-MCNC: 23 MG/DL (ref 8–23)
BUN/CREAT SERPL: 30.3 (ref 7–25)
CALCIUM SPEC-SCNC: 9.6 MG/DL (ref 8.6–10.5)
CHLORIDE SERPL-SCNC: 97 MMOL/L (ref 98–107)
CHOLEST SERPL-MCNC: 194 MG/DL (ref 0–200)
CLARITY UR: ABNORMAL
CO2 SERPL-SCNC: 30 MMOL/L (ref 22–29)
COLOR UR: YELLOW
CREAT SERPL-MCNC: 0.76 MG/DL (ref 0.57–1)
DEPRECATED RDW RBC AUTO: 43 FL (ref 37–54)
ERYTHROCYTE [DISTWIDTH] IN BLOOD BY AUTOMATED COUNT: 13.4 % (ref 12.3–15.4)
GFR SERPL CREATININE-BSD FRML MDRD: 77 ML/MIN/1.73
GLOBULIN UR ELPH-MCNC: 3.2 GM/DL
GLUCOSE SERPL-MCNC: 146 MG/DL (ref 65–99)
GLUCOSE UR STRIP-MCNC: NEGATIVE MG/DL
HBA1C MFR BLD: 6.8 % (ref 4.8–5.6)
HCT VFR BLD AUTO: 44.7 % (ref 34–46.6)
HDLC SERPL-MCNC: 42 MG/DL (ref 40–60)
HGB BLD-MCNC: 14.4 G/DL (ref 12–15.9)
HGB UR QL STRIP.AUTO: NEGATIVE
KETONES UR QL STRIP: NEGATIVE
LDLC SERPL CALC-MCNC: 98 MG/DL (ref 0–100)
LDLC/HDLC SERPL: 2.08 {RATIO}
LEUKOCYTE ESTERASE UR QL STRIP.AUTO: NEGATIVE
MCH RBC QN AUTO: 28 PG (ref 26.6–33)
MCHC RBC AUTO-ENTMCNC: 32.2 G/DL (ref 31.5–35.7)
MCV RBC AUTO: 87 FL (ref 79–97)
NITRITE UR QL STRIP: NEGATIVE
PH UR STRIP.AUTO: <=5 [PH] (ref 5–8)
PLATELET # BLD AUTO: 228 10*3/MM3 (ref 140–450)
PMV BLD AUTO: 8.3 FL (ref 6–12)
POTASSIUM SERPL-SCNC: 4.1 MMOL/L (ref 3.5–5.2)
PROT SERPL-MCNC: 7.6 G/DL (ref 6–8.5)
PROT UR QL STRIP: NEGATIVE
RBC # BLD AUTO: 5.14 10*6/MM3 (ref 3.77–5.28)
SODIUM SERPL-SCNC: 137 MMOL/L (ref 136–145)
SP GR UR STRIP: >=1.03 (ref 1–1.03)
TRIGL SERPL-MCNC: 324 MG/DL (ref 0–150)
TSH SERPL DL<=0.05 MIU/L-ACNC: 4.35 UIU/ML (ref 0.27–4.2)
UROBILINOGEN UR QL STRIP: ABNORMAL
VLDLC SERPL-MCNC: 54 MG/DL (ref 5–40)
WBC # BLD AUTO: 10.22 10*3/MM3 (ref 3.4–10.8)

## 2021-08-20 PROCEDURE — 83036 HEMOGLOBIN GLYCOSYLATED A1C: CPT | Performed by: FAMILY MEDICINE

## 2021-08-20 PROCEDURE — 81003 URINALYSIS AUTO W/O SCOPE: CPT

## 2021-08-20 PROCEDURE — 36415 COLL VENOUS BLD VENIPUNCTURE: CPT | Performed by: FAMILY MEDICINE

## 2021-08-20 PROCEDURE — 80050 GENERAL HEALTH PANEL: CPT | Performed by: FAMILY MEDICINE

## 2021-08-20 PROCEDURE — 80061 LIPID PANEL: CPT | Performed by: FAMILY MEDICINE

## 2021-08-20 PROCEDURE — 99214 OFFICE O/P EST MOD 30 MIN: CPT | Performed by: FAMILY MEDICINE

## 2021-08-20 RX ORDER — ECHINACEA 400 MG
2 CAPSULE ORAL DAILY
COMMUNITY

## 2021-08-20 NOTE — PROGRESS NOTES
Chief Complaint  Follow-up (6 MONTH - HYPERTENSION, NEUROPATHY,DIABETES TYPE 2)    Subjective          Melany Maguire presents to [facility]  --TOLERATING METFORMIN, DUE FOR LABS  --NO ISSUES WITH BP MEDS  --HAS NOTICED NO BREAST LUMPS  --SWELLING IN LEGS IS DOING BETTER  --DIABETIC NEUROPATHY IS NOT BOTHERING HER TOO MUCH AT THIS TIME           Allergies   Allergen Reactions   • Penicillins Hives        Health Maintenance Due   Topic Date Due   • URINE MICROALBUMIN  Never done   • ANNUAL PHYSICAL  Never done   • Pneumococcal Vaccine 0-64 (1 of 2 - PPSV23) Never done   • TDAP/TD VACCINES (1 - Tdap) Never done   • ZOSTER VACCINE (1 of 2) Never done   • MAMMOGRAM  10/13/2013   • HEPATITIS C SCREENING  Never done   • DIABETIC FOOT EXAM  Never done   • PAP SMEAR  Never done   • COVID-19 Vaccine (2 - Pfizer 2-dose series) 02/04/2021   • HEMOGLOBIN A1C  04/15/2021        Current Outpatient Medications on File Prior to Visit   Medication Sig   • amLODIPine (NORVASC) 10 MG tablet TAKE ONE TABLET BY MOUTH DAILY   • Bioflavonoid Products (Vitamin C Plus) 1000 MG tablet Take  by mouth.   • Black Elderberry,Berry-Flower, 575 MG capsule Take 2 capsules by mouth Daily.   • glucosamine-chondroitin 500-400 MG capsule capsule Take  by mouth 2 (two) times a day.   • lisinopril-hydrochlorothiazide (PRINZIDE,ZESTORETIC) 20-25 MG per tablet TAKE ONE TABLET BY MOUTH DAILY   • Magnesium 125 MG capsule Take  by mouth.   • metFORMIN (GLUCOPHAGE) 500 MG tablet TAKE ONE TABLET BY MOUTH TWICE A DAY   • TURMERIC CURCUMIN PO Take 1,500 mg by mouth 2 (two) times a day.   • vitamin E 100 UNIT capsule Take 100 Units by mouth Daily.   • [DISCONTINUED] ProAir  (90 Base) MCG/ACT inhaler INHALE TWO PUFFS BY MOUTH FOUR TIMES A DAY AS NEEDED   • [DISCONTINUED] diclofenac (VOLTAREN) 75 MG EC tablet TAKE ONE TABLET BY MOUTH TWICE A DAY   • [DISCONTINUED] diclofenac-miSOPROStol (ARTHROTEC 75) 75-0.2 MG EC tablet  "Take 1 tablet by mouth 2 (Two) Times a Day.   • [DISCONTINUED] ProAir  (90 Base) MCG/ACT inhaler    • [DISCONTINUED] ProAir  (90 Base) MCG/ACT inhaler INHALE TWO PUFFS BY MOUTH FOUR TIMES A DAY AS NEEDED     No current facility-administered medications on file prior to visit.       Immunization History   Administered Date(s) Administered   • COVID-19 (PFIZER) 01/14/2021       Review of Systems   Constitutional: Negative for appetite change, chills, fatigue and fever.   HENT: Negative for ear pain, rhinorrhea and sore throat.    Respiratory: Negative for cough and shortness of breath.    Cardiovascular: Negative for chest pain, palpitations and leg swelling.   Gastrointestinal: Negative for abdominal pain, nausea and vomiting.   Genitourinary: Negative for breast lump.   Musculoskeletal: Negative for arthralgias and myalgias.   Neurological: Negative for headache.        Objective     /78 (BP Location: Right arm, Patient Position: Sitting, Cuff Size: Large Adult)   Pulse 87   Ht 170.2 cm (67.01\")   Wt (!) 139 kg (306 lb 12.8 oz)   BMI 48.04 kg/m²       Physical Exam  Vitals and nursing note reviewed.   Constitutional:       General: She is not in acute distress.     Appearance: Normal appearance.   Cardiovascular:      Rate and Rhythm: Normal rate and regular rhythm.      Heart sounds: No murmur heard.     Pulmonary:      Effort: Pulmonary effort is normal.      Breath sounds: Normal breath sounds.   Abdominal:      Palpations: Abdomen is soft.      Tenderness: There is no abdominal tenderness.   Musculoskeletal:         General: No swelling.      Cervical back: Neck supple.   Lymphadenopathy:      Cervical: No cervical adenopathy.   Neurological:      General: No focal deficit present.      Mental Status: She is alert.      Cranial Nerves: No cranial nerve deficit.      Coordination: Coordination normal.      Gait: Gait normal.   Psychiatric:         Mood and Affect: Mood normal.         " Behavior: Behavior normal.         Result Review :                             Assessment and Plan      Diagnoses and all orders for this visit:    1. Essential hypertension (Primary)  Assessment & Plan:  Hypertension is improving with treatment.  Continue current treatment regimen.  Dietary sodium restriction.  Weight loss.  Regular aerobic exercise.  Blood pressure will be reassessed at the next regular appointment.    Orders:  -     CBC (No Diff)  -     TSH    2. Type 2 diabetes mellitus treated without insulin (CMS/[facility])  Assessment & Plan:  Diabetes is improving with treatment.   Continue current treatment regimen.  Diabetes will be reassessed in 6 months.    Orders:  -     Comprehensive Metabolic Panel  -     Lipid Panel  -     Hemoglobin A1c    3. Diabetic peripheral neuropathy (CMS/[facility])  Assessment & Plan:  Diabetes is improving with treatment.   Continue current treatment regimen.  Diabetes will be reassessed in 6 months.  CURRENTLY DOING WELL WITHOUT PRESCRIPTION MEDS       4. Venous insufficiency  Assessment & Plan:  CONTINUE TO ELEVATE LEGS WHEN POSSIBLE AND TO REDUCE SALT AND FLUID INTAKE       5. Encounter for breast cancer screening using non-mammogram modality          Follow Up     Return in about 6 months (around 2/20/2022).    Patient was given instructions and counseling regarding her condition or for health maintenance advice. Please see specific information pulled into the AVS if appropriate.

## 2021-08-20 NOTE — ASSESSMENT & PLAN NOTE
Diabetes is improving with treatment.   Continue current treatment regimen.  Diabetes will be reassessed in 6 months.  CURRENTLY DOING WELL WITHOUT PRESCRIPTION MEDS

## 2021-08-27 ENCOUNTER — TELEPHONE (OUTPATIENT)
Dept: PHYSICAL THERAPY | Facility: CLINIC | Age: 61
End: 2021-08-27

## 2021-08-30 RX ORDER — DICLOFENAC SODIUM 75 MG/1
75 TABLET, DELAYED RELEASE ORAL 2 TIMES DAILY
COMMUNITY
End: 2021-12-14

## 2021-08-30 RX ORDER — DICLOFENAC SODIUM 75 MG/1
TABLET, DELAYED RELEASE ORAL
Qty: 180 TABLET | Refills: 0 | Status: SHIPPED | OUTPATIENT
Start: 2021-08-30 | End: 2021-12-14

## 2021-08-31 ENCOUNTER — TREATMENT (OUTPATIENT)
Dept: PHYSICAL THERAPY | Facility: CLINIC | Age: 61
End: 2021-08-31

## 2021-08-31 DIAGNOSIS — M25.512 ACUTE PAIN OF LEFT SHOULDER: ICD-10-CM

## 2021-08-31 DIAGNOSIS — Z98.890 S/P LEFT ROTATOR CUFF REPAIR: Primary | ICD-10-CM

## 2021-08-31 DIAGNOSIS — M19.012 ARTHRITIS OF LEFT ACROMIOCLAVICULAR JOINT: ICD-10-CM

## 2021-08-31 DIAGNOSIS — M75.102 TEAR OF LEFT SUPRASPINATUS TENDON: ICD-10-CM

## 2021-08-31 PROCEDURE — 97140 MANUAL THERAPY 1/> REGIONS: CPT | Performed by: PHYSICAL THERAPIST

## 2021-08-31 PROCEDURE — 97110 THERAPEUTIC EXERCISES: CPT | Performed by: PHYSICAL THERAPIST

## 2021-08-31 NOTE — PROGRESS NOTES
Physical Therapy Daily Treatment Note      Patient: Melany Maguire   : 1960  Referring practitioner: Stalin Bains MD  Date of Initial Visit: Type: THERAPY  Noted: 2021  Today's Date: 2021  Patient seen for 3 sessions           Subjective 0/10    Objective   See Exercise, Manual, and Modality Logs for complete treatment.       Assessment/Plan  Pt started new exercises requiring vc and demonstration for safe and effective performance. Pt is showing excellent ROM at this time and having little pain in the SHLD. Cont with the POC to progress pt toward goals.      Visit Diagnoses:    ICD-10-CM ICD-9-CM   1. S/P left rotator cuff repair  Z98.890 V45.89   2. Tear of left supraspinatus tendon  M75.102 840.6   3. Arthritis of left acromioclavicular joint  M19.012 716.91   4. Acute pain of left shoulder  M25.512 719.41       Progress per Plan of Care    Timed:    Therapeutic Exercise:    23     mins  02213;  Manual Therapy:    9     mins  80100;     Neuromuscular Trena:       mins  50739;    Therapeutic Activity:          mins  16438;     Gait Training:           mins  82474;     Ultrasound:          mins  09443;      Untimed:  Electrical Stimulation:         mins  43940 ( );  Mechanical Traction:         mins  91127;     Timed Treatment:   32   mins   Total Treatment:     39   mins      Emily Almodovar PTA  Physical Therapist Assistant

## 2021-09-03 ENCOUNTER — TREATMENT (OUTPATIENT)
Dept: PHYSICAL THERAPY | Facility: CLINIC | Age: 61
End: 2021-09-03

## 2021-09-03 DIAGNOSIS — M19.012 ARTHRITIS OF LEFT ACROMIOCLAVICULAR JOINT: ICD-10-CM

## 2021-09-03 DIAGNOSIS — Z98.890 S/P LEFT ROTATOR CUFF REPAIR: Primary | ICD-10-CM

## 2021-09-03 DIAGNOSIS — M75.102 TEAR OF LEFT SUPRASPINATUS TENDON: ICD-10-CM

## 2021-09-03 PROCEDURE — 97110 THERAPEUTIC EXERCISES: CPT | Performed by: PHYSICAL THERAPIST

## 2021-09-03 PROCEDURE — 97112 NEUROMUSCULAR REEDUCATION: CPT | Performed by: PHYSICAL THERAPIST

## 2021-09-03 NOTE — PROGRESS NOTES
Physical Therapy Treatment Note      Patient: Melany Maguire   : 1960  Referring practitioner: Stalin Bains MD  Date of Initial Visit: Type: THERAPY  Noted: 2021  Today's Date: 9/3/2021  Patient seen for 4 sessions           Visit Diagnoses:    ICD-10-CM ICD-9-CM   1. S/P left rotator cuff repair  Z98.890 V45.89   2. Tear of left supraspinatus tendon  M75.102 840.6   3. Arthritis of left acromioclavicular joint  M19.012 716.91       Subjective Evaluation    History of Present Illness  Mechanism of injury: States shoulder was really sore after last session.     States notices a pop when raises arm too far up. (Discussed RCR protocol and anticipated/ expected AROM gains, precautions.)     Pain  Current pain rating: 3  Location: L shoulder           Objective           General Comments     Shoulder Comments   AROM aprox 90, able to go further with compensation, discussed mechanics and precautions.   IR to pocket       See Exercise, Manual, and Modality Logs for complete treatment.       Assessment & Plan     Assessment  Assessment details: Reviewed current routine and modified HEP.     Tolerated changes without c/o.     Pt was given WHEP of today's exercises.  Pt related understanding of precautions, progression parameters. Questions were addressed as they arose.     Nice progress at 9 weeks.  Would benefit from introducing light resistance : ROW, IR, low shoulder flexion.    Time was spent discussing progression, expectations with the RCR healing process, sequential ROM gains.     Goals  Plan Goals: Goals: STG:  Pt will demonstrate/ report:   1) knowledge and compliance with RCR  Restrictions.ONGOING  2) PROM R shoulder flexion 120, abd 90 with pulley.  3) appropriate scapular positioning/stabilization when initiating R shoulder AROM.  4) R elbow flexion 4/5, no c/o pain.  5) improve score on Quick Dash by 10%  6) Pt will be I in HEP each visit. ONGOING    LTG:   Pt will demonstrate/ report:   1)  R shoulder PROM WFL, all planes, min/no c/o pain.  2) AROM R shoulder: flexion 120+, abd 120+, ER to behind head+, IR to beltline+, min/no c/o pain.   3) Maintain proper R scapular positioning/ stabilization with use of R UE.   4) resumption of ADLS/ hobbies with min / no R shoulder limitations.  5) sleeping 75-90% or more of the night with min interruptions due to R shoulder pain.   6) be I in final HEP and progression parameters to perform after formal physical therapy has been discontinued.  7) improve Quick Dash score 20% from initial evaluation.       Plan  Plan details: Continue to progress per RCR protocol.     Nice progress at 9 weeks.  Would benefit from introducing light resistance : ROW, IR, low shoulder flexion, continued gentle stretching  to ER with shoulder in neutral.                Timed:  Manual Therapy:         mins  13553;  Therapeutic Exercise:    15     mins  44833;     Therapeutic Activity:          mins  88434;    Neuromuscular Trena:    12   mins  04522;    Gait Training:           mins  82406;     Ultrasound:          mins  54177;        Timed Treatment:   27   mins   Total Treatment:     35   mins    Kiah Prado, ORQUIDEA  Physical Therapist          This document has been electronically signed by Kiah Prado PT on September 3, 2021 10:30 EDT

## 2021-09-10 ENCOUNTER — TREATMENT (OUTPATIENT)
Dept: PHYSICAL THERAPY | Facility: CLINIC | Age: 61
End: 2021-09-10

## 2021-09-10 DIAGNOSIS — M25.512 ACUTE PAIN OF LEFT SHOULDER: ICD-10-CM

## 2021-09-10 DIAGNOSIS — M75.102 TEAR OF LEFT SUPRASPINATUS TENDON: ICD-10-CM

## 2021-09-10 DIAGNOSIS — Z98.890 S/P LEFT ROTATOR CUFF REPAIR: Primary | ICD-10-CM

## 2021-09-10 DIAGNOSIS — M19.012 ARTHRITIS OF LEFT ACROMIOCLAVICULAR JOINT: ICD-10-CM

## 2021-09-10 PROCEDURE — 97110 THERAPEUTIC EXERCISES: CPT | Performed by: PHYSICAL THERAPIST

## 2021-09-10 NOTE — PROGRESS NOTES
Physical Therapy Daily Treatment Note      Patient: Melany Maguire   : 1960  Referring practitioner: Stalin Bains MD  Date of Initial Visit: Type: THERAPY  Noted: 2021  Today's Date: 9/10/2021  Patient seen for 5 sessions           Subjective 2/10    Objective   See Exercise, Manual, and Modality Logs for complete treatment.       Assessment/Plan Pt was given the isometrics to perform at home from this point on. Pt is not having much pain at tx time. Pt ROM in flexion is excellent and is still unable to do ABD yet according to protocol. Pt does have some discomfort and pain at end range.      Visit Diagnoses:    ICD-10-CM ICD-9-CM   1. S/P left rotator cuff repair  Z98.890 V45.89   2. Tear of left supraspinatus tendon  M75.102 840.6   3. Arthritis of left acromioclavicular joint  M19.012 716.91   4. Acute pain of left shoulder  M25.512 719.41       Progress per Plan of Care    Timed:    Therapeutic Exercise:    32     mins  87034;  Manual Therapy:   3      mins  05481;     Neuromuscular Trena:       mins  45674;    Therapeutic Activity:          mins  33657;     Gait Training:           mins  90711;     Ultrasound:          mins  95169;      Untimed:  Electrical Stimulation:         mins  72305 ( );  Mechanical Traction:         mins  82370;     Timed Treatment:   35   mins   Total Treatment:     40   mins      Emily Almodovar PTA  Physical Therapist Assistant

## 2021-09-13 ENCOUNTER — TREATMENT (OUTPATIENT)
Dept: PHYSICAL THERAPY | Facility: CLINIC | Age: 61
End: 2021-09-13

## 2021-09-13 DIAGNOSIS — M75.102 TEAR OF LEFT SUPRASPINATUS TENDON: ICD-10-CM

## 2021-09-13 DIAGNOSIS — Z98.890 S/P LEFT ROTATOR CUFF REPAIR: Primary | ICD-10-CM

## 2021-09-13 PROCEDURE — 97112 NEUROMUSCULAR REEDUCATION: CPT | Performed by: PHYSICAL THERAPIST

## 2021-09-13 PROCEDURE — 97110 THERAPEUTIC EXERCISES: CPT | Performed by: PHYSICAL THERAPIST

## 2021-09-13 NOTE — PROGRESS NOTES
Physical Therapy Progress Note/ Re-Assessment      Patient: Melany Maguire   : 1960  Diagnosis/ICD-10 Code:  S/P left rotator cuff repair [Z98.890]  Referring practitioner: Stalin Bains MD  Date of Initial Visit: Type: THERAPY  Noted: 2021  Today's Date: 2021  Patient seen for 6 sessions    Recertification: 10/11/2021   Next MD appt: 2021.    Subjective:   Visit Diagnoses:    ICD-10-CM ICD-9-CM   1. S/P left rotator cuff repair  Z98.890 V45.89   2. Tear of left supraspinatus tendon  M75.102 840.6       Subjective Questionnaire: QuickDASH: next visit    Clinical Progress: improved    Home Program Compliance: Yes      Subjective Evaluation    History of Present Illness  Mechanism of injury: States the L shoulder has been feeling better. States has pain with elevation > 100 and has difficulty with end range IR. States was able to put hair in pony tail today.     States HEP going well.    States sleeping in bed  (rather than chair) again, but has soreness when sleeping on the L.     Pain  Current pain ratin  Location: L shoulder         Objective           General Comments     Shoulder Comments   AROM: L shoulder:    Flexion 125, abd 80, scaption 90 - mild c/o pain in superior shoulder at end range    MMT:   Isometric testing with arm at side: 4/5, except ER 4-; no c/opain with testing    Incision well healed, mild adhesions noted.     Assessment & Plan     Assessment  Assessment details: 11 weeks post op    Progressing nicely.    ROM progressing , need to get more ER before progressing further to elevation.     Good isometric strength, progress progressive resistance strengthening.    Increased ER after session today. Improved IR as well.     Pt is compliant, motivated, and consistent with HEP.     Pt is slowly resuming PLOF, but needs to regain strength, ROM, and stabilization to prevent injury as resumes heavier tasks.     Pt would benefit from skilled physical  therapy intervention  to address the above mentioned deficits.     Pt was given WHEP of today's exercises.  Pt related understanding of precautions, progression parameters. Questions were addressed as they arose.   Functional Limitations: carrying objects, lifting, moving in bed, reaching behind back and reaching overhead  Goals  Plan Goals: Goals: STG:  Pt will demonstrate/ report:     1) knowledge and compliance with RCR restrictions. MET    2) PROM L shoulder flexion 120, abd 90 with pulley.PROGRESSING    3) appropriate scapular positioning/stabilization when initiating L shoulder AROM. PROGRESSING    4) R elbow flexion 4/5, no c/o pain. PROGRESSING    5) improve score on Quick Dash by 10%    6) Pt will be I in HEP each visit. ONGOING    LTG:   Pt will demonstrate/ report:     1) L shoulder PROM WFL, all planes, min/no c/o pain. PROGRESSING    2) AROM L shoulder: flexion 120+, abd 120+, ER to behind head+, IR to beltline+, min/no c/o pain.  PROGRESSING    3) Maintain proper L  scapular positioning/ stabilization with use of R UE. ONGOING    4) resumption of ADLS/ hobbies with min / no L shoulder limitations.PROGRESSING    5) sleeping 75-90% or more of the night with min interruptions due to L shoulder pain.  PROGRESSING    6) be I in final HEP and progression parameters to perform after formal physical therapy has been discontinued.    7) improve Quick Dash score 20% from initial evaluation.      Plan  Duration in visits: 6  Plan details: Continue therapy as planned,  -progress stretching to IR, ER  -progress resistance strengthening  -progress scapular stabilization  -progress upper quadrant strengthening/stabilization in supine  -progress HEP       Other therapeutic activities and/or exercises will be prescribed as indicated.    Progress toward previous goals: Partially Met     Recommendations: Continue as planned    Timeframe: 3 weeks    Prognosis to achieve goals: good    PT Signature: Kiah Prado, PT      Timed:  Manual  Therapy:         mins  15200;  Therapeutic Exercise:    15     mins  71794;   Therapeutic Activity:          mins  01210;         Neuromuscular Trena:    15    mins  01680;    Gait Training:           mins  41177;     Ultrasound:          mins  29491;      Timed Treatment:   30   mins   Total Treatment:     35   mins            This document has been electronically signed by Kiah Prado PT on September 13, 2021 11:01 EDT

## 2021-09-21 ENCOUNTER — TELEPHONE (OUTPATIENT)
Dept: PHYSICAL THERAPY | Facility: CLINIC | Age: 61
End: 2021-09-21

## 2021-09-23 ENCOUNTER — OFFICE VISIT (OUTPATIENT)
Dept: ORTHOPEDIC SURGERY | Facility: CLINIC | Age: 61
End: 2021-09-23

## 2021-09-23 DIAGNOSIS — Z98.890 S/P LEFT ROTATOR CUFF REPAIR: Primary | ICD-10-CM

## 2021-09-23 PROCEDURE — 99024 POSTOP FOLLOW-UP VISIT: CPT | Performed by: ORTHOPAEDIC SURGERY

## 2021-09-29 NOTE — PROGRESS NOTES
OTHER POST OP GLOBAL     NAME: Melany Maguire  ?  : 1960  ?  MRN: [id number]    Chief Complaint   Patient presents with   • Left Shoulder - Post-op     ?  Date of surgery: 21    HPI:   Patient returns today for 3 month follow up of left rotator cuff repair. Arthroscopic portals healed nicely with no signs of infection. Patient reports doing well with no unusual complaints. Appears to be progressing appropriately.  She has done quite well post rotator cuff repair.  She is continuing with physical therapy and is making good improvements with her range of motion and strength of the rotator cuff.      Ortho Exam:   Left shoulder. The patient is status-post rotator cuff repair 3 month(s) . Incision is clean and healing well. Arthroscopic portals are clean. Appropriate amounts of swelling and bruising are noted. The patients pain is well controlled. The passive range of motion is abduction 0-120 degrees, flexion 0-120 degrees. Axillary nerve function is well preserved. Radial artery pulses are palpable.      Diagnostic Studies:  no diagnostic testing performed this visit      Assessment:  Diagnoses and all orders for this visit:    1. S/P left rotator cuff repair (Primary)          Plan   • Incision care  • Continue ice as needed  • Aggressive ROM  • Stretching and strengthening exercises  • Alternate Ibuprofen and Tylenol as needed  • Fall precautions  • Follow up in 3 month(s)     Date of encounter: 2021  Stalin Bains MD

## 2021-09-30 PROBLEM — Z98.890 S/P LEFT ROTATOR CUFF REPAIR: Status: ACTIVE | Noted: 2021-09-30

## 2021-10-08 ENCOUNTER — OFFICE VISIT (OUTPATIENT)
Dept: FAMILY MEDICINE CLINIC | Age: 61
End: 2021-10-08

## 2021-10-08 VITALS
BODY MASS INDEX: 45.99 KG/M2 | HEART RATE: 94 BPM | HEIGHT: 67 IN | SYSTOLIC BLOOD PRESSURE: 143 MMHG | DIASTOLIC BLOOD PRESSURE: 90 MMHG | WEIGHT: 293 LBS

## 2021-10-08 DIAGNOSIS — E11.9 TYPE 2 DIABETES MELLITUS TREATED WITHOUT INSULIN (HCC): ICD-10-CM

## 2021-10-08 DIAGNOSIS — G25.81 RLS (RESTLESS LEGS SYNDROME): ICD-10-CM

## 2021-10-08 DIAGNOSIS — I10 ESSENTIAL HYPERTENSION: ICD-10-CM

## 2021-10-08 DIAGNOSIS — E11.42 DIABETIC PERIPHERAL NEUROPATHY (HCC): ICD-10-CM

## 2021-10-08 DIAGNOSIS — I87.2 VENOUS INSUFFICIENCY: Primary | ICD-10-CM

## 2021-10-08 PROBLEM — Z12.39 ENCOUNTER FOR BREAST CANCER SCREENING USING NON-MAMMOGRAM MODALITY: Status: RESOLVED | Noted: 2021-08-20 | Resolved: 2021-10-08

## 2021-10-08 PROBLEM — Z98.890 S/P LEFT ROTATOR CUFF REPAIR: Status: RESOLVED | Noted: 2021-09-30 | Resolved: 2021-10-08

## 2021-10-08 PROCEDURE — 99214 OFFICE O/P EST MOD 30 MIN: CPT | Performed by: FAMILY MEDICINE

## 2021-10-08 RX ORDER — PRAMIPEXOLE DIHYDROCHLORIDE 0.5 MG/1
0.5 TABLET ORAL
Qty: 30 TABLET | Refills: 2 | Status: SHIPPED | OUTPATIENT
Start: 2021-10-08 | End: 2022-01-04

## 2021-10-08 NOTE — ASSESSMENT & PLAN NOTE
PROBABLE DIAGNOSIS, COMPLICATED BY THE DM NEUROPATHY AND VENOUS INSUFFICIENCY.  TRIAL OF MIRAPEX.  CONSIDER FURTHER WORKUP OR NEUROLOGY EVAL

## 2021-10-08 NOTE — ASSESSMENT & PLAN NOTE
ADVISED ELEVATION AS MUCH AS POSSIBLE, REDUCE SALT AND FLUID INTAKE, WEAR SUPPORT HOSE.  CONSIDER DIURETIC THERAPY.

## 2021-10-08 NOTE — ASSESSMENT & PLAN NOTE
Diabetes is worsening.   Regular aerobic exercise.  Discussed foot care.  Medication changes per orders.  Diabetes will be reassessed in 3 months   WILL OBSERVE WITH THE MIRAPEX.

## 2021-10-08 NOTE — PROGRESS NOTES
"Chief Complaint  Peripheral Neuropathy    Subjective          Melany Maguire presents to [facility]  --RECENT HGA1C WAS 6.2 %.  TOLERATING MEDS WELL  --NO APPARENT SIDE EFFECTS TO BP MEDS  --CONTINUES WITH SWELLING IN LOWER LEGS, MOSTLY AFTER SITTING.  NEG EXAM AT PREVIOUS VISIT.  IMPROVES WITH ELEVATION  --PERIPHERAL NEUROPATHY IS GETTING WORSE, DESCRIBES A NUMBNESS IN THE FEET AND A SENSITIVITY TO TOUCH IN THE LOWER LEGS.  LEGS ALSO FEEL \"RESTLESS\" AT NIGHT.  FEELS LIKE SHE NEEDS TO STRETCH THEM.          Allergies   Allergen Reactions   • Penicillins Hives        Health Maintenance Due   Topic Date Due   • URINE MICROALBUMIN  Never done   • ANNUAL PHYSICAL  Never done   • Pneumococcal Vaccine 0-64 (1 of 2 - PPSV23) Never done   • TDAP/TD VACCINES (1 - Tdap) Never done   • ZOSTER VACCINE (1 of 2) Never done   • MAMMOGRAM  10/13/2013   • HEPATITIS C SCREENING  Never done   • DIABETIC FOOT EXAM  Never done   • PAP SMEAR  Never done   • COVID-19 Vaccine (2 - Pfizer 2-dose series) 02/04/2021   • INFLUENZA VACCINE  Never done        Current Outpatient Medications on File Prior to Visit   Medication Sig   • amLODIPine (NORVASC) 10 MG tablet TAKE ONE TABLET BY MOUTH DAILY   • Bioflavonoid Products (Vitamin C Plus) 1000 MG tablet Take  by mouth.   • Black Elderberry,Berry-Flower, 575 MG capsule Take 2 capsules by mouth Daily.   • diclofenac (VOLTAREN) 75 MG EC tablet TAKE ONE TABLET BY MOUTH TWICE A DAY   • diclofenac (VOLTAREN) 75 MG EC tablet Take 75 mg by mouth 2 (Two) Times a Day.   • glucosamine-chondroitin 500-400 MG capsule capsule Take  by mouth 2 (two) times a day.   • lisinopril-hydrochlorothiazide (PRINZIDE,ZESTORETIC) 20-25 MG per tablet TAKE ONE TABLET BY MOUTH DAILY   • Magnesium 125 MG capsule Take  by mouth.   • metFORMIN (GLUCOPHAGE) 500 MG tablet TAKE ONE TABLET BY MOUTH TWICE A DAY   • TURMERIC CURCUMIN PO Take 1,500 mg by mouth 2 (two) times a day.   • vitamin E " "100 UNIT capsule Take 100 Units by mouth Daily.   • [DISCONTINUED] ProAir  (90 Base) MCG/ACT inhaler    • [DISCONTINUED] ProAir  (90 Base) MCG/ACT inhaler INHALE TWO PUFFS BY MOUTH FOUR TIMES A DAY AS NEEDED     No current facility-administered medications on file prior to visit.       Immunization History   Administered Date(s) Administered   • COVID-19 (PFIZER) 01/14/2021       Review of Systems   Constitutional: Negative for activity change, appetite change, chills, fatigue and fever.   HENT: Negative for congestion, ear pain, rhinorrhea and sore throat.    Respiratory: Negative for cough and shortness of breath.    Cardiovascular: Negative for chest pain and palpitations.   Gastrointestinal: Negative for abdominal pain, constipation, diarrhea, nausea and vomiting.   Musculoskeletal: Negative for arthralgias and myalgias.   Neurological: Negative for headache.        Objective     /90 (BP Location: Right arm, Patient Position: Sitting)   Pulse 94   Ht 170.2 cm (67\")   Wt 135 kg (297 lb 9.6 oz)   BMI 46.61 kg/m²       Physical Exam  Vitals and nursing note reviewed.   Constitutional:       General: She is not in acute distress.     Appearance: Normal appearance.   Cardiovascular:      Rate and Rhythm: Normal rate and regular rhythm.      Heart sounds: Normal heart sounds. No murmur heard.     Pulmonary:      Effort: Pulmonary effort is normal.      Breath sounds: Normal breath sounds.   Abdominal:      Palpations: Abdomen is soft.      Tenderness: There is no abdominal tenderness.   Musculoskeletal:      Cervical back: Neck supple.      Right lower leg: No edema.      Left lower leg: No edema.   Lymphadenopathy:      Cervical: No cervical adenopathy.   Neurological:      General: No focal deficit present.      Mental Status: She is alert.      Cranial Nerves: No cranial nerve deficit.      Coordination: Coordination normal.      Gait: Gait normal.   Psychiatric:         Mood and Affect: " Mood normal.         Behavior: Behavior normal.         Result Review :                             Assessment and Plan      Diagnoses and all orders for this visit:    1. Venous insufficiency (Primary)  Assessment & Plan:  ADVISED ELEVATION AS MUCH AS POSSIBLE, REDUCE SALT AND FLUID INTAKE, WEAR SUPPORT HOSE.  CONSIDER DIURETIC THERAPY.        2. Type 2 diabetes mellitus treated without insulin (HCC)  Assessment & Plan:  Diabetes is improving with treatment.   Continue current treatment regimen.  Diabetes will be reassessed in 3 months.      3. Essential hypertension  Assessment & Plan:  Hypertension is improving with treatment.  Continue current treatment regimen.  Dietary sodium restriction.  Weight loss.  Regular aerobic exercise.  Continue current medications.  Blood pressure will be reassessed at the next regular appointment.      4. Diabetic peripheral neuropathy (HCC)  Assessment & Plan:  Diabetes is worsening.   Regular aerobic exercise.  Discussed foot care.  Medication changes per orders.  Diabetes will be reassessed in 3 months   WILL OBSERVE WITH THE MIRAPEX.    Orders:  -     pramipexole (Mirapex) 0.5 MG tablet; Take 1 tablet by mouth every night at bedtime.  Dispense: 30 tablet; Refill: 2    5. RLS (restless legs syndrome)  Assessment & Plan:  PROBABLE DIAGNOSIS, COMPLICATED BY THE DM NEUROPATHY AND VENOUS INSUFFICIENCY.  TRIAL OF MIRAPEX.  CONSIDER FURTHER WORKUP OR NEUROLOGY EVAL     Orders:  -     pramipexole (Mirapex) 0.5 MG tablet; Take 1 tablet by mouth every night at bedtime.  Dispense: 30 tablet; Refill: 2          Follow Up     Return in about 3 months (around 1/8/2022).    Patient was given instructions and counseling regarding her condition or for health maintenance advice. Please see specific information pulled into the AVS if appropriate.

## 2021-10-26 ENCOUNTER — DOCUMENTATION (OUTPATIENT)
Dept: PHYSICAL THERAPY | Facility: CLINIC | Age: 61
End: 2021-10-26

## 2021-10-26 DIAGNOSIS — M25.512 ACUTE PAIN OF LEFT SHOULDER: ICD-10-CM

## 2021-10-26 DIAGNOSIS — M19.012 ARTHRITIS OF LEFT ACROMIOCLAVICULAR JOINT: ICD-10-CM

## 2021-10-26 DIAGNOSIS — Z98.890 S/P LEFT ROTATOR CUFF REPAIR: Primary | ICD-10-CM

## 2021-10-26 DIAGNOSIS — M75.102 TEAR OF LEFT SUPRASPINATUS TENDON: ICD-10-CM

## 2021-10-26 NOTE — PROGRESS NOTES
Discharge Summary  Discharge Summary from Physical Therapy Report        Patient Information  Melany Maguire  1960      Visit Diagnoses:    ICD-10-CM ICD-9-CM   1. S/P left rotator cuff repair  Z98.890 V45.89   2. Tear of left supraspinatus tendon  M75.102 840.6   3. Arthritis of left acromioclavicular joint  M19.012 716.91   4. Acute pain of left shoulder  M25.512 719.41         Number of Visits: 6     Discharge Status of Patient: See PT Note dated 9/13/21    Goals: Partially Met    Discharge Plan: D/C due to pt not returning to therapy    Date of Discharge 10/26/21        This document has been electronically signed by Kiah Prado, PT on October 26, 2021 08:50 EDT

## 2021-11-16 DIAGNOSIS — I10 ESSENTIAL HYPERTENSION: ICD-10-CM

## 2021-11-16 DIAGNOSIS — E11.9 TYPE 2 DIABETES MELLITUS TREATED WITHOUT INSULIN (HCC): Primary | ICD-10-CM

## 2021-12-14 ENCOUNTER — OFFICE VISIT (OUTPATIENT)
Dept: FAMILY MEDICINE CLINIC | Age: 61
End: 2021-12-14

## 2021-12-14 ENCOUNTER — HOSPITAL ENCOUNTER (OUTPATIENT)
Dept: GENERAL RADIOLOGY | Facility: HOSPITAL | Age: 61
Discharge: HOME | End: 2021-12-14
Admitting: NURSE PRACTITIONER

## 2021-12-14 VITALS
DIASTOLIC BLOOD PRESSURE: 69 MMHG | SYSTOLIC BLOOD PRESSURE: 132 MMHG | WEIGHT: 293 LBS | HEIGHT: 67 IN | BODY MASS INDEX: 45.99 KG/M2 | HEART RATE: 89 BPM | TEMPERATURE: 98.7 F | OXYGEN SATURATION: 95 %

## 2021-12-14 DIAGNOSIS — R05.9 COUGH: ICD-10-CM

## 2021-12-14 DIAGNOSIS — J18.9 PNEUMONITIS: Primary | ICD-10-CM

## 2021-12-14 LAB
EXPIRATION DATE: NORMAL
FLUAV AG UPPER RESP QL IA.RAPID: NOT DETECTED
FLUBV AG UPPER RESP QL IA.RAPID: NOT DETECTED
INTERNAL CONTROL: NORMAL
Lab: NORMAL
SARS-COV-2 AG UPPER RESP QL IA.RAPID: NOT DETECTED

## 2021-12-14 PROCEDURE — 99213 OFFICE O/P EST LOW 20 MIN: CPT | Performed by: NURSE PRACTITIONER

## 2021-12-14 PROCEDURE — 71046 X-RAY EXAM CHEST 2 VIEWS: CPT

## 2021-12-14 PROCEDURE — 96372 THER/PROPH/DIAG INJ SC/IM: CPT | Performed by: NURSE PRACTITIONER

## 2021-12-14 PROCEDURE — 87428 SARSCOV & INF VIR A&B AG IA: CPT | Performed by: NURSE PRACTITIONER

## 2021-12-14 RX ORDER — DOXYCYCLINE HYCLATE 100 MG/1
100 CAPSULE ORAL 2 TIMES DAILY
Qty: 14 CAPSULE | Refills: 0 | Status: SHIPPED | OUTPATIENT
Start: 2021-12-14 | End: 2021-12-21

## 2021-12-14 RX ORDER — DEXTROMETHORPHAN HYDROBROMIDE AND PROMETHAZINE HYDROCHLORIDE 15; 6.25 MG/5ML; MG/5ML
5 SYRUP ORAL 4 TIMES DAILY PRN
Qty: 180 ML | Refills: 0 | Status: SHIPPED | OUTPATIENT
Start: 2021-12-14 | End: 2022-03-30

## 2021-12-14 RX ORDER — TRIAMCINOLONE ACETONIDE 40 MG/ML
60 INJECTION, SUSPENSION INTRA-ARTICULAR; INTRAMUSCULAR ONCE
Status: DISCONTINUED | OUTPATIENT
Start: 2021-12-14 | End: 2021-12-14

## 2021-12-14 RX ORDER — TRIAMCINOLONE ACETONIDE 40 MG/ML
40 INJECTION, SUSPENSION INTRA-ARTICULAR; INTRAMUSCULAR ONCE
Status: COMPLETED | OUTPATIENT
Start: 2021-12-14 | End: 2021-12-14

## 2021-12-14 RX ORDER — MULTIPLE VITAMINS W/ MINERALS TAB 9MG-400MCG
1 TAB ORAL DAILY
COMMUNITY

## 2021-12-14 RX ADMIN — TRIAMCINOLONE ACETONIDE 40 MG: 40 INJECTION, SUSPENSION INTRA-ARTICULAR; INTRAMUSCULAR at 12:01

## 2021-12-14 NOTE — PROGRESS NOTES
"Chief Complaint  Nasal Congestion (started thursday ), Cough, Shortness of Breath, Fatigue, and Headache    Subjective    Patient is a 61-year-old female in today with complaints of cough, congestion, fatigue, headache, and shortness of breath that started on Thursday. Patient denies fever.          Melany Maguire presents to [facility]  Cough  This is a new problem. The current episode started in the past 7 days. The problem has been unchanged. The cough is productive of sputum. Associated symptoms include nasal congestion, postnasal drip and shortness of breath. Nothing aggravates the symptoms. She has tried ipratropium inhaler and OTC cough suppressant for the symptoms. The treatment provided mild relief. Her past medical history is significant for asthma.   Shortness of Breath  This is a new problem. The current episode started in the past 7 days. The problem occurs intermittently. Pertinent negatives include no leg pain. The symptoms are aggravated by exercise and weather changes. She has tried ipratropium inhalers for the symptoms. The treatment provided mild relief. Her past medical history is significant for asthma.       Objective   Vital Signs:   /69 (BP Location: Left arm, Patient Position: Sitting)   Pulse 89   Temp 98.7 °F (37.1 °C)   Ht 170.2 cm (67.01\")   Wt 136 kg (299 lb 6.4 oz)   SpO2 95%   BMI 46.88 kg/m²     Physical Exam  HENT:      Head: Normocephalic.      Right Ear: A middle ear effusion is present. Tympanic membrane is injected and erythematous.      Left Ear: Swelling present. A middle ear effusion is present. Tympanic membrane is injected and erythematous.      Nose: Congestion present.      Mouth/Throat:      Mouth: Mucous membranes are moist.      Pharynx: Oropharynx is clear. Posterior oropharyngeal erythema present. No oropharyngeal exudate.   Cardiovascular:      Rate and Rhythm: Normal rate and regular rhythm.      Pulses: Normal " pulses.      Heart sounds: Normal heart sounds.   Pulmonary:      Effort: Pulmonary effort is normal. No respiratory distress.      Breath sounds: No stridor. Wheezing and rhonchi present. No rales.   Chest:      Chest wall: No tenderness.   Skin:     General: Skin is warm and dry.   Neurological:      Mental Status: She is alert and oriented to person, place, and time.   Psychiatric:         Mood and Affect: Mood normal.        Result Review :                 Assessment and Plan    Diagnoses and all orders for this visit:    1. Pneumonitis (Primary)  -     doxycycline (VIBRAMYCIN) 100 MG capsule; Take 1 capsule by mouth 2 (Two) Times a Day for 7 days.  Dispense: 14 capsule; Refill: 0  -     XR Chest PA & Lateral; Future    2. Cough  -     POCT SARS-CoV-2 Antigen OSIEL + Flu  -     promethazine-dextromethorphan (PROMETHAZINE-DM) 6.25-15 MG/5ML syrup; Take 5 mL by mouth 4 (Four) Times a Day As Needed for Cough.  Dispense: 180 mL; Refill: 0  -     Discontinue: triamcinolone acetonide (KENALOG-40) injection 60 mg  -     XR Chest PA & Lateral; Future  -     triamcinolone acetonide (KENALOG-40) injection 40 mg        Follow Up   Return in about 2 months (around 2/14/2022), or if symptoms worsen or fail to improve.  Patient was given instructions and counseling regarding her condition or for health maintenance advice. Please see specific information pulled into the AVS if appropriate.

## 2022-01-04 DIAGNOSIS — G25.81 RLS (RESTLESS LEGS SYNDROME): ICD-10-CM

## 2022-01-04 DIAGNOSIS — E11.42 DIABETIC PERIPHERAL NEUROPATHY: ICD-10-CM

## 2022-01-04 RX ORDER — PRAMIPEXOLE DIHYDROCHLORIDE 0.5 MG/1
TABLET ORAL
Qty: 30 TABLET | Refills: 2 | Status: SHIPPED | OUTPATIENT
Start: 2022-01-04 | End: 2022-04-05

## 2022-01-19 DIAGNOSIS — E11.9 TYPE 2 DIABETES MELLITUS TREATED WITHOUT INSULIN: Primary | ICD-10-CM

## 2022-01-25 ENCOUNTER — LAB (OUTPATIENT)
Dept: LAB | Facility: HOSPITAL | Age: 62
End: 2022-01-25

## 2022-01-25 DIAGNOSIS — I10 ESSENTIAL HYPERTENSION: ICD-10-CM

## 2022-01-25 DIAGNOSIS — E11.9 TYPE 2 DIABETES MELLITUS TREATED WITHOUT INSULIN: ICD-10-CM

## 2022-01-25 LAB
ALBUMIN SERPL-MCNC: 4 G/DL (ref 3.5–5.2)
ALBUMIN/GLOB SERPL: 1.2 G/DL
ALP SERPL-CCNC: 64 U/L (ref 39–117)
ALT SERPL W P-5'-P-CCNC: 25 U/L (ref 1–33)
ANION GAP SERPL CALCULATED.3IONS-SCNC: 7.2 MMOL/L (ref 5–15)
AST SERPL-CCNC: 22 U/L (ref 1–32)
BILIRUB SERPL-MCNC: 0.4 MG/DL (ref 0–1.2)
BUN SERPL-MCNC: 22 MG/DL (ref 8–23)
BUN/CREAT SERPL: 29.3 (ref 7–25)
CALCIUM SPEC-SCNC: 9.8 MG/DL (ref 8.6–10.5)
CHLORIDE SERPL-SCNC: 99 MMOL/L (ref 98–107)
CHOLEST SERPL-MCNC: 184 MG/DL (ref 0–200)
CO2 SERPL-SCNC: 29.8 MMOL/L (ref 22–29)
CREAT SERPL-MCNC: 0.75 MG/DL (ref 0.57–1)
GFR SERPL CREATININE-BSD FRML MDRD: 79 ML/MIN/1.73
GLOBULIN UR ELPH-MCNC: 3.4 GM/DL
GLUCOSE SERPL-MCNC: 113 MG/DL (ref 65–99)
HBA1C MFR BLD: 7.14 % (ref 4.8–5.6)
HDLC SERPL-MCNC: 61 MG/DL (ref 40–60)
LDLC SERPL CALC-MCNC: 100 MG/DL (ref 0–100)
LDLC/HDLC SERPL: 1.59 {RATIO}
POTASSIUM SERPL-SCNC: 5.4 MMOL/L (ref 3.5–5.2)
PROT SERPL-MCNC: 7.4 G/DL (ref 6–8.5)
SODIUM SERPL-SCNC: 136 MMOL/L (ref 136–145)
TRIGL SERPL-MCNC: 129 MG/DL (ref 0–150)
VLDLC SERPL-MCNC: 23 MG/DL (ref 5–40)

## 2022-01-25 PROCEDURE — 36415 COLL VENOUS BLD VENIPUNCTURE: CPT

## 2022-01-25 PROCEDURE — 83036 HEMOGLOBIN GLYCOSYLATED A1C: CPT

## 2022-01-25 PROCEDURE — 80053 COMPREHEN METABOLIC PANEL: CPT

## 2022-01-25 PROCEDURE — 80061 LIPID PANEL: CPT

## 2022-03-10 ENCOUNTER — TELEPHONE (OUTPATIENT)
Dept: FAMILY MEDICINE CLINIC | Age: 62
End: 2022-03-10

## 2022-03-30 ENCOUNTER — OFFICE VISIT (OUTPATIENT)
Dept: FAMILY MEDICINE CLINIC | Age: 62
End: 2022-03-30

## 2022-03-30 ENCOUNTER — HOSPITAL ENCOUNTER (OUTPATIENT)
Dept: GENERAL RADIOLOGY | Facility: HOSPITAL | Age: 62
Discharge: HOME | End: 2022-03-30
Admitting: NURSE PRACTITIONER

## 2022-03-30 VITALS
SYSTOLIC BLOOD PRESSURE: 146 MMHG | BODY MASS INDEX: 45.99 KG/M2 | WEIGHT: 293 LBS | DIASTOLIC BLOOD PRESSURE: 80 MMHG | TEMPERATURE: 98.7 F | HEIGHT: 67 IN | HEART RATE: 95 BPM

## 2022-03-30 DIAGNOSIS — J18.9 PNEUMONITIS: ICD-10-CM

## 2022-03-30 DIAGNOSIS — Z00.00 ANNUAL PHYSICAL EXAM: ICD-10-CM

## 2022-03-30 DIAGNOSIS — R05.9 COUGH: Primary | ICD-10-CM

## 2022-03-30 PROBLEM — Z86.0100 HISTORY OF COLON POLYPS: Status: ACTIVE | Noted: 2022-03-30

## 2022-03-30 PROBLEM — Z86.010 HISTORY OF COLON POLYPS: Status: ACTIVE | Noted: 2022-03-30

## 2022-03-30 PROBLEM — J30.89 OTHER ALLERGIC RHINITIS: Status: ACTIVE | Noted: 2022-03-30

## 2022-03-30 PROBLEM — J45.20 MILD INTERMITTENT ASTHMA, UNCOMPLICATED: Status: ACTIVE | Noted: 2022-03-30

## 2022-03-30 PROCEDURE — 99396 PREV VISIT EST AGE 40-64: CPT | Performed by: FAMILY MEDICINE

## 2022-03-30 PROCEDURE — 71046 X-RAY EXAM CHEST 2 VIEWS: CPT

## 2022-03-30 RX ORDER — AZITHROMYCIN 1 G
1 PACKET (EA) ORAL ONCE
Qty: 1 PACKET | Refills: 0 | Status: SHIPPED | OUTPATIENT
Start: 2022-03-30 | End: 2022-03-30

## 2022-03-30 RX ORDER — LORATADINE 10 MG/1
10 TABLET ORAL DAILY
COMMUNITY

## 2022-03-30 RX ORDER — BROMPHENIRAMINE MALEATE, PSEUDOEPHEDRINE HYDROCHLORIDE, AND DEXTROMETHORPHAN HYDROBROMIDE 2; 30; 10 MG/5ML; MG/5ML; MG/5ML
5 SYRUP ORAL 4 TIMES DAILY PRN
Qty: 240 ML | Refills: 1 | Status: SHIPPED | OUTPATIENT
Start: 2022-03-30 | End: 2023-01-11

## 2022-03-30 NOTE — ASSESSMENT & PLAN NOTE
ADVICE GIVEN RE:  SEATBELT USE, ALCOHOL USE, HEALTHY DIET, ROUTINE EYE AND DENTAL EXAM. ADVICE GIVEN RE:  SEATBELT USE, ALCOHOL USE, HEALTHY DIET, ROUTINE EYE AND DENTAL EXAM, ROUTINE VACCINATIONS, BREAST SELF-EXAMINATION

## 2022-03-30 NOTE — PROGRESS NOTES
Chief Complaint  Hypertension and Allergies    Subjective          Melany Maguire presents to [facility]  --ANNUAL EXAM, LAST EXAM > ONE YEAR AGO, DOES NOT SMOKE, COLONOSCOPY AND MAMMOGRAM ARE UP TO DATE  --PRODUCTIVE COUGH AND PURULENT RUNNY NOSE FOR THE PAST THREE DAYS  --RECENT LABS WERE OK         Allergies   Allergen Reactions   • Penicillins Hives        Health Maintenance Due   Topic Date Due   • URINE MICROALBUMIN  Never done   • ANNUAL PHYSICAL  Never done   • Pneumococcal Vaccine 0-64 (1 of 2 - PPSV23) Never done   • TDAP/TD VACCINES (1 - Tdap) Never done   • ZOSTER VACCINE (1 of 2) Never done   • MAMMOGRAM  10/13/2013   • HEPATITIS C SCREENING  Never done   • DIABETIC FOOT EXAM  Never done   • PAP SMEAR  Never done   • INFLUENZA VACCINE  Never done        Current Outpatient Medications on File Prior to Visit   Medication Sig   • amLODIPine (NORVASC) 10 MG tablet TAKE ONE TABLET BY MOUTH DAILY   • Bioflavonoid Products (Vitamin C Plus) 1000 MG tablet Take  by mouth.   • Black Elderberry,Berry-Flower, 575 MG capsule Take 2 capsules by mouth Daily.   • glucosamine-chondroitin 500-400 MG capsule capsule Take  by mouth 2 (two) times a day.   • lisinopril-hydrochlorothiazide (PRINZIDE,ZESTORETIC) 20-25 MG per tablet TAKE ONE TABLET BY MOUTH DAILY   • loratadine (CLARITIN) 10 MG tablet Take 10 mg by mouth Daily.   • Magnesium 125 MG capsule Take  by mouth.   • metFORMIN (GLUCOPHAGE) 500 MG tablet TAKE ONE TABLET BY MOUTH TWICE A DAY   • multivitamin with minerals tablet tablet Take 1 tablet by mouth Daily.   • pramipexole (MIRAPEX) 0.5 MG tablet TAKE ONE TABLET BY MOUTH EVERY NIGHT AT BEDTIME   • ProAir  (90 Base) MCG/ACT inhaler    • TURMERIC CURCUMIN PO Take 1,500 mg by mouth 2 (two) times a day.   • vitamin E 100 UNIT capsule Take 100 Units by mouth Daily.   • [DISCONTINUED] promethazine-dextromethorphan (PROMETHAZINE-DM) 6.25-15 MG/5ML syrup Take 5 mL by mouth 4  "(Four) Times a Day As Needed for Cough.     No current facility-administered medications on file prior to visit.       Immunization History   Administered Date(s) Administered   • COVID-19 (PFIZER) PURPLE CAP 01/14/2021       Review of Systems   Constitutional: Positive for fatigue. Negative for activity change, appetite change, chills and fever.   HENT: Positive for congestion and rhinorrhea. Negative for ear pain, hearing loss and sore throat.    Eyes: Negative for blurred vision and discharge.   Respiratory: Positive for cough. Negative for shortness of breath.    Cardiovascular: Negative for chest pain, palpitations and leg swelling.   Gastrointestinal: Negative for abdominal pain, constipation, diarrhea, nausea and vomiting.   Genitourinary: Negative for dysuria and hematuria.   Musculoskeletal: Negative for arthralgias and myalgias.   Neurological: Negative for headache.   Psychiatric/Behavioral: Negative for depressed mood.        Objective     /80 (BP Location: Left arm, Patient Position: Sitting)   Pulse 95   Temp 98.7 °F (37.1 °C) (Oral)   Ht 170.2 cm (67\")   Wt (!) 137 kg (302 lb)   BMI 47.30 kg/m²       Physical Exam  Vitals and nursing note reviewed.   Constitutional:       General: She is not in acute distress.     Appearance: Normal appearance.   HENT:      Right Ear: Tympanic membrane normal.      Left Ear: Tympanic membrane normal.      Mouth/Throat:      Pharynx: Oropharynx is clear.   Eyes:      Conjunctiva/sclera: Conjunctivae normal.   Cardiovascular:      Rate and Rhythm: Normal rate and regular rhythm.      Heart sounds: Normal heart sounds. No murmur heard.  Pulmonary:      Effort: Pulmonary effort is normal.      Breath sounds: Normal breath sounds.   Abdominal:      General: Bowel sounds are normal.      Palpations: Abdomen is soft.      Tenderness: There is no abdominal tenderness.   Musculoskeletal:      Cervical back: Neck supple.      Right lower leg: No edema.      Left " lower leg: No edema.   Lymphadenopathy:      Cervical: No cervical adenopathy.   Neurological:      General: No focal deficit present.      Mental Status: She is alert.      Cranial Nerves: No cranial nerve deficit.      Coordination: Coordination normal.      Gait: Gait normal.   Psychiatric:         Mood and Affect: Mood normal.         Behavior: Behavior normal.         Result Review :                             Assessment and Plan      Diagnoses and all orders for this visit:    1. Cough (Primary)  Comments:  MOST LIKELY RELATED TO HER ALLERGIES BUT WILL COVER AS NOTED, FLUIDS, REST, OTC MEDS PRN.      2. Annual physical exam  Assessment & Plan:  ADVICE GIVEN RE:  SEATBELT USE, ALCOHOL USE, HEALTHY DIET, ROUTINE EYE AND DENTAL EXAM. ADVICE GIVEN RE:  SEATBELT USE, ALCOHOL USE, HEALTHY DIET, ROUTINE EYE AND DENTAL EXAM, ROUTINE VACCINATIONS, BREAST SELF-EXAMINATION       Other orders  -     azithromycin (Zithromax) 1 g powder; Take 1 packet by mouth 1 (One) Time for 1 dose.  Dispense: 1 packet; Refill: 0  -     brompheniramine-pseudoephedrine-DM 30-2-10 MG/5ML syrup; Take 5 mL by mouth 4 (Four) Times a Day As Needed for Congestion, Cough or Allergies.  Dispense: 240 mL; Refill: 1          Follow Up     Return in about 6 months (around 9/30/2022).    Patient was given instructions and counseling regarding her condition or for health maintenance advice. Please see specific information pulled into the AVS if appropriate.

## 2022-04-04 DIAGNOSIS — G25.81 RLS (RESTLESS LEGS SYNDROME): ICD-10-CM

## 2022-04-04 DIAGNOSIS — E11.42 DIABETIC PERIPHERAL NEUROPATHY: ICD-10-CM

## 2022-04-05 RX ORDER — PRAMIPEXOLE DIHYDROCHLORIDE 0.5 MG/1
0.5 TABLET ORAL
Qty: 90 TABLET | Refills: 1 | Status: SHIPPED | OUTPATIENT
Start: 2022-04-05 | End: 2022-10-07 | Stop reason: SDUPTHER

## 2022-04-05 NOTE — TELEPHONE ENCOUNTER
Rx Refill Note  Requested Prescriptions     Pending Prescriptions Disp Refills   • pramipexole (MIRAPEX) 0.5 MG tablet [Pharmacy Med Name: PRAMIPEXOLE 0.5 MG TABLET] 30 tablet 2     Sig: TAKE ONE TABLET BY MOUTH EVERY NIGHT AT BEDTIME      Last office visit with prescribing clinician: 3/30/2022      Next office visit with prescribing clinician: 9/29/2022  Last script: 01/04/22, #30    Misa Ball LPN  04/05/22, 15:20 EDT

## 2022-07-11 DIAGNOSIS — I10 ESSENTIAL HYPERTENSION: Primary | ICD-10-CM

## 2022-07-11 RX ORDER — LISINOPRIL AND HYDROCHLOROTHIAZIDE 25; 20 MG/1; MG/1
1 TABLET ORAL DAILY
Qty: 90 TABLET | Refills: 1 | Status: SHIPPED | OUTPATIENT
Start: 2022-07-11 | End: 2022-10-13 | Stop reason: SDUPTHER

## 2022-07-11 RX ORDER — AMLODIPINE BESYLATE 10 MG/1
10 TABLET ORAL DAILY
Qty: 90 TABLET | Refills: 1 | Status: SHIPPED | OUTPATIENT
Start: 2022-07-11 | End: 2022-10-13 | Stop reason: SDUPTHER

## 2022-07-11 NOTE — TELEPHONE ENCOUNTER
Rx Refill Note  Rx Refill Note  Requested Prescriptions     Pending Prescriptions Disp Refills   • amLODIPine (NORVASC) 10 MG tablet 90 tablet 1     Sig: Take 1 tablet by mouth Daily.     Signed Prescriptions Disp Refills   • lisinopril-hydrochlorothiazide (PRINZIDE,ZESTORETIC) 20-25 MG per tablet 90 tablet 1     Sig: Take 1 tablet by mouth Daily.     Authorizing Provider: LYLE LEMUS     Ordering User: TRISTAN OVALLE      Last office visit with prescribing clinician: 3/30/2022      Next office visit with prescribing clinician: 9/29/2022    Misa Ovalle LPN  07/11/22, 15:25 EDT

## 2022-10-07 DIAGNOSIS — E11.42 DIABETIC PERIPHERAL NEUROPATHY: ICD-10-CM

## 2022-10-07 DIAGNOSIS — G25.81 RLS (RESTLESS LEGS SYNDROME): ICD-10-CM

## 2022-10-07 RX ORDER — PRAMIPEXOLE DIHYDROCHLORIDE 0.5 MG/1
0.5 TABLET ORAL
Qty: 90 TABLET | Refills: 1 | Status: SHIPPED | OUTPATIENT
Start: 2022-10-07 | End: 2022-10-13 | Stop reason: SDUPTHER

## 2022-10-07 NOTE — TELEPHONE ENCOUNTER
Rx Refill Note  Requested Prescriptions     Pending Prescriptions Disp Refills   • pramipexole (MIRAPEX) 0.5 MG tablet 90 tablet 1     Sig: Take 1 tablet by mouth every night at bedtime.      Last office visit with prescribing clinician: 3/30/2022      Next office visit with prescribing clinician: 10/13/2022    Misa Ball LPN  10/07/22, 13:00 EDT

## 2022-10-13 ENCOUNTER — TELEPHONE (OUTPATIENT)
Dept: FAMILY MEDICINE CLINIC | Age: 62
End: 2022-10-13

## 2022-10-13 ENCOUNTER — APPOINTMENT (OUTPATIENT)
Dept: LAB | Facility: HOSPITAL | Age: 62
End: 2022-10-13

## 2022-10-13 ENCOUNTER — OFFICE VISIT (OUTPATIENT)
Dept: FAMILY MEDICINE CLINIC | Age: 62
End: 2022-10-13

## 2022-10-13 VITALS
TEMPERATURE: 98.9 F | OXYGEN SATURATION: 95 % | HEART RATE: 81 BPM | SYSTOLIC BLOOD PRESSURE: 156 MMHG | BODY MASS INDEX: 45.99 KG/M2 | HEIGHT: 67 IN | DIASTOLIC BLOOD PRESSURE: 88 MMHG | WEIGHT: 293 LBS

## 2022-10-13 DIAGNOSIS — G25.81 RLS (RESTLESS LEGS SYNDROME): ICD-10-CM

## 2022-10-13 DIAGNOSIS — J45.20 MILD INTERMITTENT ASTHMA, UNCOMPLICATED: ICD-10-CM

## 2022-10-13 DIAGNOSIS — I10 ESSENTIAL HYPERTENSION: ICD-10-CM

## 2022-10-13 DIAGNOSIS — E11.9 TYPE 2 DIABETES MELLITUS TREATED WITHOUT INSULIN: Primary | ICD-10-CM

## 2022-10-13 DIAGNOSIS — Z86.010 HISTORY OF COLON POLYPS: ICD-10-CM

## 2022-10-13 DIAGNOSIS — Z12.31 ENCOUNTER FOR SCREENING MAMMOGRAM FOR BREAST CANCER: ICD-10-CM

## 2022-10-13 DIAGNOSIS — F32.9 REACTIVE DEPRESSION: ICD-10-CM

## 2022-10-13 DIAGNOSIS — E11.42 DIABETIC PERIPHERAL NEUROPATHY: ICD-10-CM

## 2022-10-13 PROBLEM — Z28.21 VACCINATION REFUSED BY PATIENT: Status: ACTIVE | Noted: 2022-10-13

## 2022-10-13 PROBLEM — Z00.00 ANNUAL PHYSICAL EXAM: Status: RESOLVED | Noted: 2022-03-30 | Resolved: 2022-10-13

## 2022-10-13 PROCEDURE — 99214 OFFICE O/P EST MOD 30 MIN: CPT | Performed by: FAMILY MEDICINE

## 2022-10-13 RX ORDER — MIRTAZAPINE 15 MG/1
15 TABLET, FILM COATED ORAL NIGHTLY
Qty: 30 TABLET | Refills: 0 | Status: SHIPPED | OUTPATIENT
Start: 2022-10-13 | End: 2022-11-15

## 2022-10-13 RX ORDER — AMLODIPINE BESYLATE 10 MG/1
10 TABLET ORAL DAILY
Qty: 90 TABLET | Refills: 1 | Status: SHIPPED | OUTPATIENT
Start: 2022-10-13

## 2022-10-13 RX ORDER — LISINOPRIL AND HYDROCHLOROTHIAZIDE 25; 20 MG/1; MG/1
1 TABLET ORAL DAILY
Qty: 90 TABLET | Refills: 1 | Status: SHIPPED | OUTPATIENT
Start: 2022-10-13

## 2022-10-13 RX ORDER — PRAMIPEXOLE DIHYDROCHLORIDE 0.5 MG/1
0.5 TABLET ORAL
Qty: 90 TABLET | Refills: 1 | Status: SHIPPED | OUTPATIENT
Start: 2022-10-13

## 2022-10-13 NOTE — ASSESSMENT & PLAN NOTE
Diabetes is worsening.   Continue current treatment regimen.  Diabetes will be reassessed in 3 months   CONSIDER STARTING PRN GABAPENTIN BUT WILL SEE IF THE REMERON HAS AN EFFECT FIRST.  .

## 2022-10-13 NOTE — PROGRESS NOTES
Chief Complaint  Hypertension, Diabetes (6 month. ), and Depression (Pt has been feeling down since June. )    Subjective          Melany Maguire presents to [facility]  History of Present Illness  --TOLERATING BLOOD PRESSURE MEDICATION WITHOUT APPARENT SIDE EFFECTS  --LAST HGA1C WAS 7.14 % ON CURRENT MEDS  --THE NUMBNESS AND BURNING IN HER FEET HAVE GOTTEN WORSE.  TOOK SOME OLD GABAPENTIN WITH GOOD RESULTS  --DEPRESSED SINCE HER FATHER'S PASSING A FEW WEEKS AGO.  SHE WAS HIS MAIN CAREGIVER AND HIS DEMISE WAS SO RAPID SHE WAS NOT ABLE TO PREPARE OR TO SAY GOODBYE SATISFACTORILY.  FLEETING S.I. BUT NO PLAN  --DUE FOR SOME ROUTINE LABS AND A MAMMOGRAM  --DECLINES A FLU SHOT         Allergies   Allergen Reactions   • Penicillins Hives        Health Maintenance Due   Topic Date Due   • URINE MICROALBUMIN  Never done   • Pneumococcal Vaccine 0-64 (1 - PCV) Never done   • TDAP/TD VACCINES (1 - Tdap) Never done   • ZOSTER VACCINE (1 of 2) Never done   • MAMMOGRAM  10/13/2013   • HEPATITIS C SCREENING  Never done   • DIABETIC FOOT EXAM  Never done   • PAP SMEAR  Never done   • COVID-19 Vaccine (4 - Booster for Pfizer series) 01/25/2022   • HEMOGLOBIN A1C  07/25/2022   • INFLUENZA VACCINE  Never done        Current Outpatient Medications on File Prior to Visit   Medication Sig   • Bioflavonoid Products (Vitamin C Plus) 1000 MG tablet Take  by mouth.   • Black Elderberry,Berry-Flower, 575 MG capsule Take 2 capsules by mouth Daily.   • loratadine (CLARITIN) 10 MG tablet Take 10 mg by mouth Daily.   • Magnesium 125 MG capsule Take  by mouth.   • multivitamin with minerals tablet tablet Take 1 tablet by mouth Daily.   • vitamin E 100 UNIT capsule Take 100 Units by mouth Daily.   • [DISCONTINUED] amLODIPine (NORVASC) 10 MG tablet Take 1 tablet by mouth Daily.   • [DISCONTINUED] lisinopril-hydrochlorothiazide (PRINZIDE,ZESTORETIC) 20-25 MG per tablet Take 1 tablet by mouth Daily.   •  "[DISCONTINUED] metFORMIN (GLUCOPHAGE) 500 MG tablet Take 1 tablet by mouth 2 (Two) Times a Day.   • [DISCONTINUED] pramipexole (MIRAPEX) 0.5 MG tablet Take 1 tablet by mouth every night at bedtime.   • [DISCONTINUED] ProAir  (90 Base) MCG/ACT inhaler    • brompheniramine-pseudoephedrine-DM 30-2-10 MG/5ML syrup Take 5 mL by mouth 4 (Four) Times a Day As Needed for Congestion, Cough or Allergies.   • glucosamine-chondroitin 500-400 MG capsule capsule Take  by mouth 2 (two) times a day.   • TURMERIC CURCUMIN PO Take 1,500 mg by mouth 2 (two) times a day.     No current facility-administered medications on file prior to visit.       Immunization History   Administered Date(s) Administered   • COVID-19 (MODERNA) 1st, 2nd, 3rd Dose Only 11/30/2021   • COVID-19 (PFIZER) PURPLE CAP 01/14/2021, 02/04/2021       Review of Systems   Constitutional: Positive for fatigue. Negative for activity change, appetite change, chills and fever.   HENT: Negative for congestion, ear pain, rhinorrhea and sore throat.    Respiratory: Negative for cough and shortness of breath.    Cardiovascular: Negative for chest pain, palpitations and leg swelling.   Gastrointestinal: Negative for abdominal pain, constipation, diarrhea, nausea and vomiting.   Musculoskeletal: Negative for arthralgias and myalgias.   Neurological: Negative for headache.        Objective     /88 (BP Location: Right arm, Patient Position: Sitting, Cuff Size: Large Adult)   Pulse 81   Temp 98.9 °F (37.2 °C) (Oral)   Ht 170.2 cm (67.01\")   Wt (!) 141 kg (310 lb 3.2 oz)   SpO2 95% Comment: room air  BMI 48.57 kg/m²       Physical Exam  Vitals and nursing note reviewed.   Constitutional:       General: She is not in acute distress.     Appearance: Normal appearance.   Cardiovascular:      Rate and Rhythm: Normal rate and regular rhythm.      Pulses:           Dorsalis pedis pulses are 2+ on the right side and 2+ on the left side.        Posterior tibial pulses " are 1+ on the right side and 1+ on the left side.      Heart sounds: Normal heart sounds. No murmur heard.  Pulmonary:      Effort: Pulmonary effort is normal.      Breath sounds: Normal breath sounds.   Abdominal:      Palpations: Abdomen is soft.      Tenderness: There is no abdominal tenderness.   Musculoskeletal:      Cervical back: Neck supple.      Right lower leg: No edema.      Left lower leg: No edema.      Right foot: Normal range of motion. No deformity, bunion, Charcot foot, foot drop or prominent metatarsal heads.      Left foot: Normal range of motion. No deformity, bunion, Charcot foot, foot drop or prominent metatarsal heads.   Feet:      Right foot:      Protective Sensation: 0 sites tested. 4 sites sensed.      Skin integrity: Skin integrity normal.      Toenail Condition: Right toenails are normal.      Left foot:      Protective Sensation: 2 sites tested. 4 sites sensed.      Skin integrity: Skin integrity normal.      Toenail Condition: Left toenails are normal.      Comments:      Lymphadenopathy:      Cervical: No cervical adenopathy.   Neurological:      General: No focal deficit present.      Mental Status: She is alert.      Cranial Nerves: No cranial nerve deficit.      Coordination: Coordination normal.      Gait: Gait normal.   Psychiatric:         Mood and Affect: Mood normal.         Behavior: Behavior normal.         Result Review :                             Assessment and Plan      Diagnoses and all orders for this visit:    1. Type 2 diabetes mellitus treated without insulin (HCC) (Primary)  Assessment & Plan:  Diabetes is improving with treatment.   Continue current treatment regimen.  Diabetes will be reassessed in 6 months.    Orders:  -     Hemoglobin A1c  -     Lipid Panel  -     metFORMIN (GLUCOPHAGE) 500 MG tablet; Take 1 tablet by mouth 2 (Two) Times a Day.  Dispense: 180 tablet; Refill: 1    2. Mild intermittent asthma, uncomplicated  -     ProAir  (90 Base)  MCG/ACT inhaler; Inhale 2 puffs Every 4 (Four) Hours As Needed for Wheezing.  Dispense: 18 g; Refill: 2    3. Essential hypertension  Assessment & Plan:  Hypertension is improving with treatment.  Continue current treatment regimen.  Continue current medications.  Blood pressure will be reassessed at the next regular appointment.    Orders:  -     CBC (No Diff)  -     Comprehensive Metabolic Panel  -     TSH Rfx On Abnormal To Free T4  -     lisinopril-hydrochlorothiazide (PRINZIDE,ZESTORETIC) 20-25 MG per tablet; Take 1 tablet by mouth Daily.  Dispense: 90 tablet; Refill: 1  -     amLODIPine (NORVASC) 10 MG tablet; Take 1 tablet by mouth Daily.  Dispense: 90 tablet; Refill: 1    4. Diabetic peripheral neuropathy (HCC)  Assessment & Plan:  Diabetes is worsening.   Continue current treatment regimen.  Diabetes will be reassessed in 3 months   CONSIDER STARTING PRN GABAPENTIN BUT WILL SEE IF THE REMERON HAS AN EFFECT FIRST.  .    Orders:  -     pramipexole (MIRAPEX) 0.5 MG tablet; Take 1 tablet by mouth every night at bedtime.  Dispense: 90 tablet; Refill: 1    5. RLS (restless legs syndrome)  -     pramipexole (MIRAPEX) 0.5 MG tablet; Take 1 tablet by mouth every night at bedtime.  Dispense: 90 tablet; Refill: 1    6. Reactive depression  Assessment & Plan:  Patient's depression is single episode and is moderate without psychosis. Their depression is currently active and the condition is newly identified. This will be reassessed in 3 months. F/U as described:patient was prescribed an antidepressant medicine   THIS IS A NEW DIAGNOSIS RELATED TO INCOMPLETE CLOSURE.  DECLINES COUNSELING.  HAS HAD FLEETING S.I. BUT IS ABLE TO CONTRACT.  WILL START AN SSRI AND CALL HER IN A FEW WEEKS.  .    Orders:  -     mirtazapine (Remeron) 15 MG tablet; Take 1 tablet by mouth Every Night.  Dispense: 30 tablet; Refill: 0    7. Encounter for screening mammogram for breast cancer  -     Mammo Screening Bilateral With CAD; Future    8.  History of colon polyps  Assessment & Plan:  WE WILL CHECK TO SEE WHEN SHE IS DUE FOR A REPEAT SCOPE             Follow Up     Return in about 3 months (around 1/13/2023).    Patient was given instructions and counseling regarding her condition or for health maintenance advice. Please see specific information pulled into the AVS if appropriate.

## 2022-10-13 NOTE — ASSESSMENT & PLAN NOTE
Patient's depression is single episode and is moderate without psychosis. Their depression is currently active and the condition is newly identified. This will be reassessed in 3 months. F/U as described:patient was prescribed an antidepressant medicine   THIS IS A NEW DIAGNOSIS RELATED TO INCOMPLETE CLOSURE.  DECLINES COUNSELING.  HAS HAD FLEETING S.I. BUT IS ABLE TO CONTRACT.  WILL START AN SSRI AND CALL HER IN A FEW WEEKS.  .

## 2022-10-13 NOTE — TELEPHONE ENCOUNTER
----- Message from Leighton Finch MD sent at 10/13/2022  9:57 AM EDT -----  PLEASE ASK DR STEEL'S OFFICE WHEN SHE NEEDS ANOTHER COLONOSCOPY ANG GET HER AN APPT IF NEEDED.  THANKS

## 2022-10-13 NOTE — TELEPHONE ENCOUNTER
Dr Phoenix's office said to fax them a copy of her demographics and they will mail her a records release. Since they are no longer associated with [facility], they no longer have the records. With a signed release from the pt she can request them.  Faxed demographics.

## 2022-10-20 ENCOUNTER — TELEPHONE (OUTPATIENT)
Dept: FAMILY MEDICINE CLINIC | Age: 62
End: 2022-10-20

## 2022-10-20 NOTE — TELEPHONE ENCOUNTER
----- Message from Misa Ball LPN sent at 10/18/2022  8:06 AM EDT -----      ----- Message -----  From: SYSTEM  Sent: 10/18/2022   1:04 AM EDT  To: [facility]

## 2022-10-27 ENCOUNTER — TELEPHONE (OUTPATIENT)
Dept: FAMILY MEDICINE CLINIC | Age: 62
End: 2022-10-27

## 2022-10-27 NOTE — TELEPHONE ENCOUNTER
Spoke with pt regarding overdue lab orders, pt states that she will be in Wednesday morning to complete. // 10/27

## 2022-11-02 ENCOUNTER — LAB (OUTPATIENT)
Dept: LAB | Facility: HOSPITAL | Age: 62
End: 2022-11-02

## 2022-11-02 LAB
ALBUMIN SERPL-MCNC: 4.2 G/DL (ref 3.5–5.2)
ALBUMIN/GLOB SERPL: 1.3 G/DL
ALP SERPL-CCNC: 65 U/L (ref 39–117)
ALT SERPL W P-5'-P-CCNC: 27 U/L (ref 1–33)
ANION GAP SERPL CALCULATED.3IONS-SCNC: 12 MMOL/L (ref 5–15)
AST SERPL-CCNC: 25 U/L (ref 1–32)
BILIRUB SERPL-MCNC: 0.4 MG/DL (ref 0–1.2)
BUN SERPL-MCNC: 24 MG/DL (ref 8–23)
BUN/CREAT SERPL: 28.9 (ref 7–25)
CALCIUM SPEC-SCNC: 10.2 MG/DL (ref 8.6–10.5)
CHLORIDE SERPL-SCNC: 96 MMOL/L (ref 98–107)
CHOLEST SERPL-MCNC: 174 MG/DL (ref 0–200)
CO2 SERPL-SCNC: 30 MMOL/L (ref 22–29)
CREAT SERPL-MCNC: 0.83 MG/DL (ref 0.57–1)
DEPRECATED RDW RBC AUTO: 42.8 FL (ref 37–54)
EGFRCR SERPLBLD CKD-EPI 2021: 79.8 ML/MIN/1.73
ERYTHROCYTE [DISTWIDTH] IN BLOOD BY AUTOMATED COUNT: 13.2 % (ref 12.3–15.4)
GLOBULIN UR ELPH-MCNC: 3.2 GM/DL
GLUCOSE SERPL-MCNC: 192 MG/DL (ref 65–99)
HBA1C MFR BLD: 8.3 % (ref 4.8–5.6)
HCT VFR BLD AUTO: 46.5 % (ref 34–46.6)
HDLC SERPL-MCNC: 50 MG/DL (ref 40–60)
HGB BLD-MCNC: 14.2 G/DL (ref 12–15.9)
LDLC SERPL CALC-MCNC: 83 MG/DL (ref 0–100)
LDLC/HDLC SERPL: 1.5 {RATIO}
MCH RBC QN AUTO: 26.9 PG (ref 26.6–33)
MCHC RBC AUTO-ENTMCNC: 30.5 G/DL (ref 31.5–35.7)
MCV RBC AUTO: 88.2 FL (ref 79–97)
PLATELET # BLD AUTO: 245 10*3/MM3 (ref 140–450)
PMV BLD AUTO: 8.6 FL (ref 6–12)
POTASSIUM SERPL-SCNC: 4.8 MMOL/L (ref 3.5–5.2)
PROT SERPL-MCNC: 7.4 G/DL (ref 6–8.5)
RBC # BLD AUTO: 5.27 10*6/MM3 (ref 3.77–5.28)
SODIUM SERPL-SCNC: 138 MMOL/L (ref 136–145)
TRIGL SERPL-MCNC: 246 MG/DL (ref 0–150)
TSH SERPL DL<=0.05 MIU/L-ACNC: 3.99 UIU/ML (ref 0.27–4.2)
VLDLC SERPL-MCNC: 41 MG/DL (ref 5–40)
WBC NRBC COR # BLD: 9.63 10*3/MM3 (ref 3.4–10.8)

## 2022-11-02 PROCEDURE — 83036 HEMOGLOBIN GLYCOSYLATED A1C: CPT | Performed by: FAMILY MEDICINE

## 2022-11-02 PROCEDURE — 80050 GENERAL HEALTH PANEL: CPT | Performed by: FAMILY MEDICINE

## 2022-11-02 PROCEDURE — 36415 COLL VENOUS BLD VENIPUNCTURE: CPT | Performed by: FAMILY MEDICINE

## 2022-11-02 PROCEDURE — 80061 LIPID PANEL: CPT | Performed by: FAMILY MEDICINE

## 2022-11-03 DIAGNOSIS — E11.9 TYPE 2 DIABETES MELLITUS TREATED WITHOUT INSULIN: Primary | ICD-10-CM

## 2022-11-03 DIAGNOSIS — I10 ESSENTIAL HYPERTENSION: ICD-10-CM

## 2022-11-03 DIAGNOSIS — E78.1 HIGH TRIGLYCERIDES: ICD-10-CM

## 2022-11-14 ENCOUNTER — TELEPHONE (OUTPATIENT)
Dept: FAMILY MEDICINE CLINIC | Age: 62
End: 2022-11-14

## 2022-11-14 DIAGNOSIS — F32.9 REACTIVE DEPRESSION: Primary | ICD-10-CM

## 2022-11-14 NOTE — TELEPHONE ENCOUNTER
SHE CAN TRY TAKING TWO OF THE 16 MG REMERON AT NIGHT AND LET'S ALSO GET HER AN APPT WITH SIENNA HIGGINS

## 2022-11-14 NOTE — TELEPHONE ENCOUNTER
Dr Finch said to call and check on her and see how  her depression is?  He saw her 10/13/22.   [facility].

## 2022-11-15 RX ORDER — MIRTAZAPINE 15 MG/1
30 TABLET, FILM COATED ORAL NIGHTLY
Qty: 60 TABLET | Refills: 0 | Status: SHIPPED | OUTPATIENT
Start: 2022-11-15 | End: 2022-12-13

## 2022-11-15 NOTE — TELEPHONE ENCOUNTER
Left message to double up on the Remeron at night, take two at night.   Referral placed for Anju Huerta.

## 2022-12-13 DIAGNOSIS — F32.9 REACTIVE DEPRESSION: ICD-10-CM

## 2022-12-13 RX ORDER — MIRTAZAPINE 15 MG/1
TABLET, FILM COATED ORAL
Qty: 60 TABLET | Refills: 0 | Status: SHIPPED | OUTPATIENT
Start: 2022-12-13 | End: 2023-01-12

## 2023-01-09 ENCOUNTER — TELEPHONE (OUTPATIENT)
Dept: FAMILY MEDICINE CLINIC | Age: 63
End: 2023-01-09
Payer: COMMERCIAL

## 2023-01-09 ENCOUNTER — TRANSCRIBE ORDERS (OUTPATIENT)
Dept: ADMINISTRATIVE | Facility: HOSPITAL | Age: 63
End: 2023-01-09
Payer: COMMERCIAL

## 2023-01-09 DIAGNOSIS — Z12.31 VISIT FOR SCREENING MAMMOGRAM: Primary | ICD-10-CM

## 2023-01-09 NOTE — TELEPHONE ENCOUNTER
----- Message from Anh Anderson MA sent at 10/24/2022  8:25 AM EDT -----      ----- Message -----  From: SYSTEM  Sent: 10/23/2022   1:08 AM EDT  To: [facility]

## 2023-01-11 ENCOUNTER — OFFICE VISIT (OUTPATIENT)
Dept: FAMILY MEDICINE CLINIC | Age: 63
End: 2023-01-11
Payer: COMMERCIAL

## 2023-01-11 VITALS
HEART RATE: 91 BPM | SYSTOLIC BLOOD PRESSURE: 165 MMHG | WEIGHT: 293 LBS | HEIGHT: 67 IN | BODY MASS INDEX: 45.99 KG/M2 | DIASTOLIC BLOOD PRESSURE: 92 MMHG

## 2023-01-11 DIAGNOSIS — E11.9 TYPE 2 DIABETES MELLITUS TREATED WITHOUT INSULIN: Primary | ICD-10-CM

## 2023-01-11 DIAGNOSIS — J45.20 MILD INTERMITTENT ASTHMA, UNCOMPLICATED: ICD-10-CM

## 2023-01-11 DIAGNOSIS — E11.65 TYPE 2 DIABETES MELLITUS WITH HYPERGLYCEMIA, WITHOUT LONG-TERM CURRENT USE OF INSULIN: ICD-10-CM

## 2023-01-11 DIAGNOSIS — I10 ESSENTIAL HYPERTENSION: ICD-10-CM

## 2023-01-11 DIAGNOSIS — G25.81 RLS (RESTLESS LEGS SYNDROME): ICD-10-CM

## 2023-01-11 DIAGNOSIS — F34.1 DYSTHYMIC DISORDER: ICD-10-CM

## 2023-01-11 PROBLEM — E11.42 DIABETIC PERIPHERAL NEUROPATHY: Status: RESOLVED | Noted: 2021-08-20 | Resolved: 2023-01-11

## 2023-01-11 PROCEDURE — 99214 OFFICE O/P EST MOD 30 MIN: CPT | Performed by: FAMILY MEDICINE

## 2023-01-11 NOTE — ASSESSMENT & PLAN NOTE
Diabetes is worsening.   Continue current treatment regimen.  Diabetes will be reassessed in 6 months   NOT AT GOAL, WILL REPEAT LABS IN A MONTH AND ADJUST MEDS AS INDICATED .

## 2023-01-11 NOTE — PROGRESS NOTES
Chief Complaint  Diabetes (3 months)    Subjective          Melany Maguire presents to [facility]  History of Present Illness  --TOLERATING BLOOD PRESSURE MEDICATION WITHOUT APPARENT SIDE EFFECTS.  HAS NOT BEEN CHECKING AT HOME  --DEPRESSION IS MUCH IMPROVED WITH THE REMEROL  --THE RLS IS IMPROVED WITH THE MIRAPEX  --BREATHING IS STABLE ON CURRENT INHALED MEDS  --LAST HGA1C WAS 8.3 %, HAS BEEN WORKING ON DIET BUT THE RECENT HOLIDAYS WERE DIFFICULT        Allergies   Allergen Reactions   • Penicillins Hives        Health Maintenance Due   Topic Date Due   • URINE MICROALBUMIN  Never done   • ZOSTER VACCINE (1 of 2) Never done   • MAMMOGRAM  10/13/2013   • HEPATITIS C SCREENING  Never done   • DIABETIC FOOT EXAM  Never done   • PAP SMEAR  Never done   • COVID-19 Vaccine (4 - Booster for Pfizer series) 01/25/2022        Current Outpatient Medications on File Prior to Visit   Medication Sig   • amLODIPine (NORVASC) 10 MG tablet Take 1 tablet by mouth Daily.   • Bioflavonoid Products (Vitamin C Plus) 1000 MG tablet Take  by mouth.   • Black Elderberry,Berry-Flower, 575 MG capsule Take 2 capsules by mouth Daily.   • lisinopril-hydrochlorothiazide (PRINZIDE,ZESTORETIC) 20-25 MG per tablet Take 1 tablet by mouth Daily.   • loratadine (CLARITIN) 10 MG tablet Take 10 mg by mouth Daily.   • Magnesium 125 MG capsule Take  by mouth.   • metFORMIN (GLUCOPHAGE) 500 MG tablet Take 1 tablet by mouth 2 (Two) Times a Day.   • multivitamin with minerals tablet tablet Take 1 tablet by mouth Daily.   • pramipexole (MIRAPEX) 0.5 MG tablet Take 1 tablet by mouth every night at bedtime.   • ProAir  (90 Base) MCG/ACT inhaler Inhale 2 puffs Every 4 (Four) Hours As Needed for Wheezing.   • vitamin E 100 UNIT capsule Take 100 Units by mouth Daily.     No current facility-administered medications on file prior to visit.       Immunization History   Administered Date(s) Administered   • COVID-19  "(MODERNA) 1st, 2nd, 3rd Dose Only 11/30/2021   • COVID-19 (PFIZER) PURPLE CAP 01/14/2021, 02/04/2021       Review of Systems   Constitutional: Negative for activity change, appetite change, chills, fatigue and fever.   HENT: Negative for congestion, ear pain, rhinorrhea and sore throat.    Respiratory: Negative for cough and shortness of breath.    Cardiovascular: Negative for chest pain, palpitations and leg swelling.   Gastrointestinal: Negative for abdominal pain, constipation, diarrhea, nausea and vomiting.   Musculoskeletal: Negative for arthralgias and myalgias.   Neurological: Negative for headache.        Objective     /92 (BP Location: Left arm, Patient Position: Sitting)   Pulse 91   Ht 170.2 cm (67\")   Wt (!) 142 kg (312 lb)   BMI 48.87 kg/m²       Physical Exam  Vitals and nursing note reviewed.   Constitutional:       General: She is not in acute distress.     Appearance: Normal appearance.   Cardiovascular:      Rate and Rhythm: Normal rate and regular rhythm.      Heart sounds: Normal heart sounds. No murmur heard.  Pulmonary:      Effort: Pulmonary effort is normal.      Breath sounds: Normal breath sounds.   Abdominal:      Palpations: Abdomen is soft.      Tenderness: There is no abdominal tenderness.   Musculoskeletal:      Cervical back: Neck supple.      Right lower leg: No edema.      Left lower leg: No edema.   Lymphadenopathy:      Cervical: No cervical adenopathy.   Neurological:      General: No focal deficit present.      Mental Status: She is alert.      Cranial Nerves: No cranial nerve deficit.      Coordination: Coordination normal.      Gait: Gait normal.   Psychiatric:         Mood and Affect: Mood normal.         Behavior: Behavior normal.         Result Review :                             Assessment and Plan      Diagnoses and all orders for this visit:    1. Type 2 diabetes mellitus treated without insulin (HCC) (Primary)  Assessment & Plan:  Diabetes is worsening. "   Continue current treatment regimen.  Diabetes will be reassessed in 6 months   NOT AT GOAL, WILL REPEAT LABS IN A MONTH AND ADJUST MEDS AS INDICATED .      2. RLS (restless legs syndrome)  Assessment & Plan:  IMPROVED WITH CURRENT TREATMENT, CONTINUE SAME, WILL REEVALUATE AT NEXT VISIT       3. Essential hypertension  Assessment & Plan:  Hypertension is worsening.  Continue current treatment regimen.  Weight loss.  Regular aerobic exercise.  Continue current medications.  Blood pressure will be reassessed at the next regular appointment.  NOT AT GOAL TODAY, SHE WILL START MONITORING AT HOME MORE REGULARLY        4. Mild intermittent asthma, uncomplicated  Assessment & Plan:  Asthma is improving with treatment.  The patient is experiencing no daytime asthma symptoms. She is experiencing no nighttime asthma symptoms.  Discussed monitoring symptoms and use of quick-relief medications and contacting us early in the course of exacerbations.          5. Dysthymic disorder  Assessment & Plan:  Patient's depression is single episode and is moderate without psychosis. Their depression is currently in partial remission and the condition is improving with treatment. This will be reassessed at the next regular appointment. F/U as described:patient will continue current medication therapy.      6. Type 2 diabetes mellitus with hyperglycemia, without long-term current use of insulin (HCC)  Comments:  PLANS AS NOTED           Follow Up     Return in about 6 months (around 7/11/2023).    Patient was given instructions and counseling regarding her condition or for health maintenance advice. Please see specific information pulled into the AVS if appropriate.

## 2023-01-11 NOTE — ASSESSMENT & PLAN NOTE
Hypertension is worsening.  Continue current treatment regimen.  Weight loss.  Regular aerobic exercise.  Continue current medications.  Blood pressure will be reassessed at the next regular appointment.  NOT AT GOAL TODAY, SHE WILL START MONITORING AT HOME MORE REGULARLY

## 2023-01-12 DIAGNOSIS — F32.9 REACTIVE DEPRESSION: ICD-10-CM

## 2023-01-12 RX ORDER — MIRTAZAPINE 15 MG/1
TABLET, FILM COATED ORAL
Qty: 60 TABLET | Refills: 0 | Status: SHIPPED | OUTPATIENT
Start: 2023-01-12 | End: 2023-02-09

## 2023-02-09 DIAGNOSIS — F32.9 REACTIVE DEPRESSION: ICD-10-CM

## 2023-02-09 RX ORDER — MIRTAZAPINE 15 MG/1
TABLET, FILM COATED ORAL
Qty: 60 TABLET | Refills: 2 | Status: SHIPPED | OUTPATIENT
Start: 2023-02-09 | End: 2023-02-10 | Stop reason: DRUGHIGH

## 2023-02-09 NOTE — TELEPHONE ENCOUNTER
Rx Refill Note  Requested Prescriptions     Pending Prescriptions Disp Refills   • mirtazapine (REMERON) 15 MG tablet [Pharmacy Med Name: MIRTAZAPINE 15 MG TABLET] 60 tablet 0     Sig: TAKE 2 TABLETS BY MOUTH EVERY NIGHT      Last office visit with prescribing clinician: 1/11/2023       Next office visit with prescribing clinician: 7/14/2023    {Anh Anderson LPN  02/09/23, 10:18 EST

## 2023-02-10 ENCOUNTER — DOCUMENTATION (OUTPATIENT)
Dept: FAMILY MEDICINE CLINIC | Age: 63
End: 2023-02-10
Payer: COMMERCIAL

## 2023-02-10 ENCOUNTER — PRIOR AUTHORIZATION (OUTPATIENT)
Dept: FAMILY MEDICINE CLINIC | Age: 63
End: 2023-02-10
Payer: COMMERCIAL

## 2023-02-10 RX ORDER — MIRTAZAPINE 30 MG/1
30 TABLET, FILM COATED ORAL NIGHTLY
Qty: 90 TABLET | Refills: 1 | Status: SHIPPED | OUTPATIENT
Start: 2023-02-10

## 2023-02-10 NOTE — TELEPHONE ENCOUNTER
Can pt take 30mg 1 tablet nightly of Mirtazapine rather than 2 tablets of 15mg? If so, please send new rx or I can PA 15mg. Thank you.

## 2023-02-13 ENCOUNTER — TELEPHONE (OUTPATIENT)
Dept: FAMILY MEDICINE CLINIC | Age: 63
End: 2023-02-13
Payer: COMMERCIAL

## 2023-02-14 ENCOUNTER — LAB (OUTPATIENT)
Dept: LAB | Facility: HOSPITAL | Age: 63
End: 2023-02-14
Payer: COMMERCIAL

## 2023-02-14 DIAGNOSIS — E11.9 TYPE 2 DIABETES MELLITUS TREATED WITHOUT INSULIN: ICD-10-CM

## 2023-02-14 DIAGNOSIS — E78.1 HIGH TRIGLYCERIDES: ICD-10-CM

## 2023-02-14 DIAGNOSIS — I10 ESSENTIAL HYPERTENSION: ICD-10-CM

## 2023-02-14 LAB
ALBUMIN SERPL-MCNC: 4.2 G/DL (ref 3.5–5.2)
ALBUMIN/GLOB SERPL: 1.3 G/DL
ALP SERPL-CCNC: 66 U/L (ref 39–117)
ALT SERPL W P-5'-P-CCNC: 30 U/L (ref 1–33)
ANION GAP SERPL CALCULATED.3IONS-SCNC: 10.7 MMOL/L (ref 5–15)
AST SERPL-CCNC: 27 U/L (ref 1–32)
BILIRUB SERPL-MCNC: 0.2 MG/DL (ref 0–1.2)
BUN SERPL-MCNC: 27 MG/DL (ref 8–23)
BUN/CREAT SERPL: 30 (ref 7–25)
CALCIUM SPEC-SCNC: 10.4 MG/DL (ref 8.6–10.5)
CHLORIDE SERPL-SCNC: 98 MMOL/L (ref 98–107)
CHOLEST SERPL-MCNC: 166 MG/DL (ref 0–200)
CO2 SERPL-SCNC: 30.3 MMOL/L (ref 22–29)
CREAT SERPL-MCNC: 0.9 MG/DL (ref 0.57–1)
EGFRCR SERPLBLD CKD-EPI 2021: 72.4 ML/MIN/1.73
GLOBULIN UR ELPH-MCNC: 3.2 GM/DL
GLUCOSE SERPL-MCNC: 171 MG/DL (ref 65–99)
HBA1C MFR BLD: 8.4 % (ref 4.8–5.6)
HDLC SERPL-MCNC: 53 MG/DL (ref 40–60)
LDLC SERPL CALC-MCNC: 60 MG/DL (ref 0–100)
LDLC/HDLC SERPL: 0.83 {RATIO}
POTASSIUM SERPL-SCNC: 4.3 MMOL/L (ref 3.5–5.2)
PROT SERPL-MCNC: 7.4 G/DL (ref 6–8.5)
SODIUM SERPL-SCNC: 139 MMOL/L (ref 136–145)
TRIGL SERPL-MCNC: 346 MG/DL (ref 0–150)
VLDLC SERPL-MCNC: 53 MG/DL (ref 5–40)

## 2023-02-14 PROCEDURE — 83036 HEMOGLOBIN GLYCOSYLATED A1C: CPT

## 2023-02-14 PROCEDURE — 80053 COMPREHEN METABOLIC PANEL: CPT

## 2023-02-14 PROCEDURE — 36415 COLL VENOUS BLD VENIPUNCTURE: CPT

## 2023-02-14 PROCEDURE — 80061 LIPID PANEL: CPT

## 2023-02-17 DIAGNOSIS — I10 ESSENTIAL HYPERTENSION: ICD-10-CM

## 2023-02-17 DIAGNOSIS — E11.9 TYPE 2 DIABETES MELLITUS TREATED WITHOUT INSULIN: Primary | ICD-10-CM

## 2023-03-10 ENCOUNTER — TELEPHONE (OUTPATIENT)
Dept: FAMILY MEDICINE CLINIC | Age: 63
End: 2023-03-10
Payer: COMMERCIAL

## 2023-04-05 NOTE — PROGRESS NOTES
"Melany Maguire. 1960     Office/Outpatient Visit    Visit Date: Wed, Jun 20, 2018 09:31 am    Provider: Yareli Turner MD (Assistant: Elysia Peterson MA)    Location: [facility]        Electronically signed by Yareli Turner MD on  06/20/2018 01:16:18 PM                             SUBJECTIVE:        CC:     Ms. Maguire is a 57 year old White female.  Patient complains of sinus congestion and drainage, along with a sore throat.;         HPI:     Melany is here today with complaint sore throat for the past 5 days.  She initially presented to urgent care on Saturday morning.  She was prescribed Mucinex and nasal spray.  No fevers or chills.  +Fatigue and malaise.  +Bilateral ear pain, R > L.  No facial pain or pressure.  No headache.  +Rhinorrhea and congestion.  +Sore throat -- \"like knives.\"  Moderate cough, productive of yellow green sputum.  No SOB or CP.  No abd pain, N/V/D.  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     ROS:     CONSTITUTIONAL:  Positive for fatigue and malaise.   Negative for chills or fever.      EYES:  Negative for blurred vision.      E/N/T:  Positive for ear pain ( bilateral ), nasal congestion, frequent rhinorrhea and sore throat.   Negative for diminished hearing or sinus pressure.      CARDIOVASCULAR:  Negative for chest pain and palpitations.      RESPIRATORY:  Positive for recent cough ( with copious amounts of purulent sputum ) and frequent wheezing.   Negative for dyspnea.      GASTROINTESTINAL:  Negative for abdominal pain, diarrhea, nausea and vomiting.      MUSCULOSKELETAL:  Negative for myalgias.          PMH/FMH/SH:     Last Reviewed on 6/20/2018 09:44 AM by Yareli Turner    Past Medical History:             GYNECOLOGICAL HISTORY:    Last mammogram was 2012         CURRENT MEDICAL PROVIDERS:    Obstetrician/Gynecologist         PREVENTIVE HEALTH MAINTENANCE             COLORECTAL CANCER SCREENING:; colonoscopy with the following " Left Voicemail (1st Attempt) for the patient to call back and schedule the following:    Appointment type: Follow up visit  Provider: Carli Potter PA-C  Return date: July 2023  Specialty phone number: [phone]  Additional appointment(s) needed: NA  Additonal Notes: HENRIQUE [place]       abnormalities noted-- Polyp(s); The next colonoscopy is due  ;;; 2017;;     INFLUENZA VACCINE: declines, understands reason for immunization 2016;;         Surgical History:         Cholecystectomy    : X 2;      Dilation and Curettage         Family History:         Positive for Congestive Heart Failure ( mother ) and Coronary Artery Disease ( father; sister ).      Positive for Type 2 Diabetes ( mother; sister ).          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          Alcohol:  Does not drink alcohol and never has.      Caffeine:  She admits to consuming caffeine via tea ( 2 servings per day ) and soda ( 2 servings per day ).          Substance Abuse History:     Last Reviewed on 2018 09:44 AM by Yareli Turner         Mental Health History:     Last Reviewed on 2018 09:44 AM by Yareli Turner        Communicable Diseases (eg STDs):     Last Reviewed on 2018 09:44 AM by Yareli Turner            Current Problems:     Last Reviewed on 2017 11:07 AM by Leighton Finch    HTN     Venous insufficiency     NIDDM (diet-controlled)     History of colonic polyps     Elevated triglycerides     Palpitations         Immunizations:     None        Allergies:     Last Reviewed on 2018 09:34 AM by Elysia Peterson    Penicillins:        Current Medications:     Last Reviewed on 2018 09:34 AM by Elysia Peterson    Lisinopril/Hydrochlorothiazide 20mg/25mg Tablet Take 1 tablet(s) by mouth daily     Amlodipine  10mg Tablet 1 tablet daily.     OTC Glusoimaine Take one tablet once daily     OTC Tumeric Take one tablet once daily     OTC Vitamin B12 Take one tablet once daily         OBJECTIVE:        Vitals:         Current: 2018 9:33:59 AM    Ht:  5 ft, 7 in;  Wt:  "309.7 lbs;  BMI: 48.5    T: 98.9 F (oral);  BP: 144/93 mm Hg (left arm, sitting);  P: 101 bpm (left arm (BP Cuff), sitting);  sCr: 0.94 mg/dL;  GFR: 92.18        Repeat:     9:39:01 AM     BP:   146/90mm Hg (left arm, sitting)         Exams:     PHYSICAL EXAM:     GENERAL: vital signs recorded - well developed, well nourished;  well groomed;  no apparent distress;     EYES: extraocular movements intact; conjunctiva and cornea are normal; PERRLA;     E/N/T: EARS: external auditory canal normal bilaterally;  left TM is normal and the right TM is bulging and dull;  NOSE: nasal mucosa is edematous and erythematous;  no sinus tenderness; OROPHARYNX: oral mucosa reveals erythema;  posterior pharynx shows no exudate;     RESPIRATORY: normal respiratory rate and pattern with no distress; normal breath sounds with no rales, rhonchi, wheezes or rubs;     CARDIOVASCULAR: normal rate; rhythm is regular;  no systolic murmur;     GASTROINTESTINAL: nontender; normal bowel sounds;     LYMPHATIC: no enlargement of cervical or facial nodes;     MUSCULOSKELETAL: normal gait; normal overall tone         Lab/Test Results:             Rapid Strep Screen:  Negative (06/20/2018),     Performed by::  bubba (06/20/2018),             ASSESSMENT           382.00   H66.001  R otitis media              DDx:     462   J02.9  Sore throat              DDx:         ORDERS:         Meds Prescribed:       Azithromycin 250mg Tablet 2 po today, 1 po x 4 days  #6 (Six) tablet(s) Refills: 0         Lab Orders:       [id number]  Group A Streptococcus detection by immunoassay with direct optical observation  (In-House)         [id number]  [facility] Throat culture, strep  (Send-Out)                   PLAN:          R otitis media +ROM on exam today.  She has a PCN allergy from childhood and cannot remember exactly what happened, but remembers she was \"rushed to the hospital.\"  I am going to assume it may have been anaphylaxis so will avoid both PCNs and " cephalosporins.  Treating with a Z-pack as below.  Symptomatic care recommended with OTC analgesics for pain/fever, OTC decongestants and cough suppressants prn.  Stay well hydrated.  Humidifier in the bedroom at night.  Hot tea with lemon and honey.  RTC prn worsening or failure to improve of sx.           Prescriptions:       Azithromycin 250mg Tablet 2 po today, 1 po x 4 days  #6 (Six) tablet(s) Refills: 0           Patient Education Handouts:       [facility] [place] [facility]           Sore throat           Orders:       [id number]  Group A Streptococcus detection by immunoassay with direct optical observation  (In-House)         [id number]  [facility] Throat culture, strep  (Send-Out)               CHARGE CAPTURE           **Please note: ICD descriptions below are intended for billing purposes only and may not represent clinical diagnoses**        Primary Diagnosis:         382.00 R otitis media            H66.001    Acute suppurative otitis media without spontaneous rupture of ear drum, right ear              Orders:          [id number]   Office/outpatient visit; established patient, level 3  (In-House)           462 Sore throat            J02.9    Acute pharyngitis, unspecified              Orders:          [id number]   Group A Streptococcus detection by immunoassay with direct optical observation  (In-House)

## 2023-05-19 ENCOUNTER — HOSPITAL ENCOUNTER (OUTPATIENT)
Dept: MAMMOGRAPHY | Facility: HOSPITAL | Age: 63
Discharge: HOME | End: 2023-05-19
Admitting: FAMILY MEDICINE
Payer: COMMERCIAL

## 2023-05-19 DIAGNOSIS — Z12.31 VISIT FOR SCREENING MAMMOGRAM: ICD-10-CM

## 2023-05-19 PROCEDURE — 77063 BREAST TOMOSYNTHESIS BI: CPT

## 2023-05-19 PROCEDURE — 77067 SCR MAMMO BI INCL CAD: CPT

## 2023-05-22 ENCOUNTER — TELEPHONE (OUTPATIENT)
Dept: FAMILY MEDICINE CLINIC | Age: 63
End: 2023-05-22
Payer: COMMERCIAL

## 2023-05-22 NOTE — TELEPHONE ENCOUNTER
----- Message from Alesia Hollingsworth LPN sent at 5/22/2023  8:07 AM EDT -----      ----- Message -----  From: SYSTEM  Sent: 5/22/2023   1:31 AM EDT  To: [facility] [place] [facility]

## 2023-06-01 DIAGNOSIS — E11.9 TYPE 2 DIABETES MELLITUS TREATED WITHOUT INSULIN: ICD-10-CM

## 2023-06-02 ENCOUNTER — LAB (OUTPATIENT)
Dept: LAB | Facility: HOSPITAL | Age: 63
End: 2023-06-02
Payer: COMMERCIAL

## 2023-06-02 DIAGNOSIS — I10 ESSENTIAL HYPERTENSION: ICD-10-CM

## 2023-06-02 DIAGNOSIS — E11.9 TYPE 2 DIABETES MELLITUS TREATED WITHOUT INSULIN: ICD-10-CM

## 2023-06-02 LAB
ALBUMIN SERPL-MCNC: 4 G/DL (ref 3.5–5.2)
ALBUMIN/GLOB SERPL: 1.3 G/DL
ALP SERPL-CCNC: 63 U/L (ref 39–117)
ALT SERPL W P-5'-P-CCNC: 30 U/L (ref 1–33)
ANION GAP SERPL CALCULATED.3IONS-SCNC: 9.5 MMOL/L (ref 5–15)
AST SERPL-CCNC: 24 U/L (ref 1–32)
BILIRUB SERPL-MCNC: 0.3 MG/DL (ref 0–1.2)
BUN SERPL-MCNC: 18 MG/DL (ref 8–23)
BUN/CREAT SERPL: 25.7 (ref 7–25)
CALCIUM SPEC-SCNC: 9.1 MG/DL (ref 8.6–10.5)
CHLORIDE SERPL-SCNC: 101 MMOL/L (ref 98–107)
CHOLEST SERPL-MCNC: 166 MG/DL (ref 0–200)
CO2 SERPL-SCNC: 28.5 MMOL/L (ref 22–29)
CREAT SERPL-MCNC: 0.7 MG/DL (ref 0.57–1)
EGFRCR SERPLBLD CKD-EPI 2021: 97.9 ML/MIN/1.73
GLOBULIN UR ELPH-MCNC: 3 GM/DL
GLUCOSE SERPL-MCNC: 178 MG/DL (ref 65–99)
HBA1C MFR BLD: 8.2 % (ref 4.8–5.6)
HDLC SERPL-MCNC: 43 MG/DL (ref 40–60)
LDLC SERPL CALC-MCNC: 80 MG/DL (ref 0–100)
LDLC/HDLC SERPL: 1.64 {RATIO}
POTASSIUM SERPL-SCNC: 4.6 MMOL/L (ref 3.5–5.2)
PROT SERPL-MCNC: 7 G/DL (ref 6–8.5)
SODIUM SERPL-SCNC: 139 MMOL/L (ref 136–145)
TRIGL SERPL-MCNC: 262 MG/DL (ref 0–150)
VLDLC SERPL-MCNC: 43 MG/DL (ref 5–40)

## 2023-06-02 PROCEDURE — 80061 LIPID PANEL: CPT

## 2023-06-02 PROCEDURE — 80053 COMPREHEN METABOLIC PANEL: CPT

## 2023-06-02 PROCEDURE — 36415 COLL VENOUS BLD VENIPUNCTURE: CPT

## 2023-06-02 PROCEDURE — 83036 HEMOGLOBIN GLYCOSYLATED A1C: CPT

## 2023-07-14 PROBLEM — R06.83 SNORING: Status: ACTIVE | Noted: 2023-07-14

## 2023-08-18 ENCOUNTER — OFFICE VISIT (OUTPATIENT)
Dept: FAMILY MEDICINE CLINIC | Age: 63
End: 2023-08-18
Payer: COMMERCIAL

## 2023-08-18 VITALS
DIASTOLIC BLOOD PRESSURE: 88 MMHG | HEIGHT: 67 IN | HEART RATE: 82 BPM | BODY MASS INDEX: 45.99 KG/M2 | WEIGHT: 293 LBS | SYSTOLIC BLOOD PRESSURE: 153 MMHG

## 2023-08-18 DIAGNOSIS — Z12.4 ENCOUNTER FOR PAPANICOLAOU SMEAR FOR CERVICAL CANCER SCREENING: ICD-10-CM

## 2023-08-18 DIAGNOSIS — N89.8 VAGINAL DISCHARGE: ICD-10-CM

## 2023-08-18 DIAGNOSIS — N39.0 ACUTE UTI: ICD-10-CM

## 2023-08-18 DIAGNOSIS — R30.0 DYSURIA: ICD-10-CM

## 2023-08-18 DIAGNOSIS — Z01.419 WELL WOMAN EXAM WITH ROUTINE GYNECOLOGICAL EXAM: Primary | ICD-10-CM

## 2023-08-18 LAB
BACTERIA UR QL AUTO: ABNORMAL /HPF
BILIRUB UR QL STRIP: NEGATIVE
CANDIDA SPECIES: NEGATIVE
CLARITY UR: ABNORMAL
COLOR UR: YELLOW
GARDNERELLA VAGINALIS: NEGATIVE
GLUCOSE UR STRIP-MCNC: ABNORMAL MG/DL
HGB UR QL STRIP.AUTO: ABNORMAL
KETONES UR QL STRIP: NEGATIVE
LEUKOCYTE ESTERASE UR QL STRIP.AUTO: ABNORMAL
NITRITE UR QL STRIP: POSITIVE
PH UR STRIP.AUTO: 7 [PH] (ref 5–8)
PROT UR QL STRIP: ABNORMAL
RBC # UR STRIP: ABNORMAL /HPF
REF LAB TEST METHOD: ABNORMAL
SP GR UR STRIP: 1.02 (ref 1–1.03)
SQUAMOUS #/AREA URNS HPF: ABNORMAL /HPF
T VAGINALIS DNA VAG QL PROBE+SIG AMP: NEGATIVE
UROBILINOGEN UR QL STRIP: ABNORMAL
WBC # UR STRIP: ABNORMAL /HPF

## 2023-08-18 PROCEDURE — G0123 SCREEN CERV/VAG THIN LAYER: HCPCS | Performed by: NURSE PRACTITIONER

## 2023-08-18 PROCEDURE — 87077 CULTURE AEROBIC IDENTIFY: CPT | Performed by: NURSE PRACTITIONER

## 2023-08-18 PROCEDURE — 87480 CANDIDA DNA DIR PROBE: CPT | Performed by: NURSE PRACTITIONER

## 2023-08-18 PROCEDURE — 87660 TRICHOMONAS VAGIN DIR PROBE: CPT | Performed by: NURSE PRACTITIONER

## 2023-08-18 PROCEDURE — 87624 HPV HI-RISK TYP POOLED RSLT: CPT | Performed by: NURSE PRACTITIONER

## 2023-08-18 PROCEDURE — 81001 URINALYSIS AUTO W/SCOPE: CPT | Performed by: NURSE PRACTITIONER

## 2023-08-18 PROCEDURE — 87510 GARDNER VAG DNA DIR PROBE: CPT | Performed by: NURSE PRACTITIONER

## 2023-08-18 PROCEDURE — 87186 SC STD MICRODIL/AGAR DIL: CPT | Performed by: NURSE PRACTITIONER

## 2023-08-18 PROCEDURE — 87086 URINE CULTURE/COLONY COUNT: CPT | Performed by: NURSE PRACTITIONER

## 2023-08-18 RX ORDER — NITROFURANTOIN 25; 75 MG/1; MG/1
100 CAPSULE ORAL 2 TIMES DAILY
Qty: 10 CAPSULE | Refills: 0 | Status: SHIPPED | OUTPATIENT
Start: 2023-08-18 | End: 2023-08-23

## 2023-08-18 NOTE — PROGRESS NOTES
"Melany Maguire presents to [facility] with complaint of  Gynecologic Exam    SUBJECTIVE  History of Present Illness    WELL WOMAN EXAM:  Her last well woman  exam was  8 year(s) ago.  She is post menopause. Sexually active?  yes    Last pap smear was done  8 yrs ago .  Her last pap smear was normal.        She performs self breast exams occasionally and rarely.  Denies any concerns with findings on exam.  Her last mammogram was last done  8 months ago.  Mammogram results were normal.  Patient is also having dysuria that has been present for a couple of days.  She denies any increase in vaginal discharge or vaginal itching.  She denies any flank pain, fever, or chills.    OBJECTIVE  Vital Signs:   /88 (BP Location: Right arm, Patient Position: Sitting)   Pulse 82   Ht 170.2 cm (67\")   Wt (!) 144 kg (316 lb 12.8 oz)   BMI 49.62 kg/mý       Physical Exam  Vitals reviewed. Exam conducted with a chaperone present.   Constitutional:       General: She is not in acute distress.     Appearance: Normal appearance. She is not ill-appearing.   HENT:      Head: Normocephalic and atraumatic.      Nose: Nose normal.      Mouth/Throat:      Mouth: Mucous membranes are moist.      Pharynx: Oropharynx is clear.   Cardiovascular:      Rate and Rhythm: Normal rate and regular rhythm.      Pulses: Normal pulses.      Heart sounds: Normal heart sounds.   Pulmonary:      Effort: Pulmonary effort is normal.      Breath sounds: Normal breath sounds.   Chest:      Comments: Patient deferred breast exam  Abdominal:      Hernia: There is no hernia in the left inguinal area or right inguinal area.   Genitourinary:     General: Normal vulva.      Exam position: Lithotomy position.      Pubic Area: No rash or pubic lice.       Labia:         Right: No rash, tenderness, lesion or injury.         Left: No rash, tenderness, lesion or injury.       Urethra: No prolapse, urethral pain, urethral " swelling or urethral lesion.      Vagina: Normal. Vaginal discharge present.      Cervix: Normal.      Uterus: Normal.       Comments: Had difficulty fully visualizing cervix due to body habitus  Musculoskeletal:      Cervical back: Neck supple.   Lymphadenopathy:      Lower Body: No right inguinal adenopathy. No left inguinal adenopathy.   Skin:     General: Skin is warm and dry.   Neurological:      General: No focal deficit present.      Mental Status: She is alert and oriented to person, place, and time. Mental status is at baseline.   Psychiatric:         Mood and Affect: Mood normal.         Behavior: Behavior normal.         Judgment: Judgment normal.      Results review:  Office Visit on 08/18/2023   Component Date Value Ref Range Status    Color, UA 08/18/2023 Yellow  Yellow, Straw Final    Appearance, UA 08/18/2023 Cloudy (A)  Clear Final    pH, UA 08/18/2023 7.0  5.0 - 8.0 Final    Specific Gravity, UA 08/18/2023 1.020  1.005 - 1.030 Final    Glucose, UA 08/18/2023 100 mg/dL (Trace) (A)  Negative Final    Ketones, UA 08/18/2023 Negative  Negative Final    Bilirubin, UA 08/18/2023 Negative  Negative Final    Blood, UA 08/18/2023 Small (1+) (A)  Negative Final    Protein, UA 08/18/2023 30 mg/dL (1+) (A)  Negative Final    Leuk Esterase, UA 08/18/2023 Moderate (2+) (A)  Negative Final    Nitrite, UA 08/18/2023 Positive (A)  Negative Final    Urobilinogen, UA 08/18/2023 0.2 E.U./dL  0.2 - 1.0 E.U./dL Final    RBC, UA 08/18/2023 0-2 (A)  None Seen /HPF Final    WBC, UA 08/18/2023 31-50 (A)  None Seen /HPF Final    Bacteria, UA 08/18/2023 4+ (A)  None Seen /HPF Final    Squamous Epithelial Cells, UA 08/18/2023 7-12 (A)  None Seen, 0-2 /HPF Final    Methodology 08/18/2023 Manual Light Microscopy   Final           ASSESSMENT AND PLAN:  Diagnoses and all orders for this visit:    1. Well woman exam with routine gynecological exam (Primary)  -     IgP, Aptima HPV; Future  -     IgP, Aptima HPV    2. Encounter for  Papanicolaou smear for cervical cancer screening  -     IgP, Aptima HPV; Future  -     IgP, Aptima HPV    3. Vaginal discharge  -     Gardnerella vaginalis, Trichomonas vaginalis, Candida albicans, DNA - Swab, Vagina; Future  -     Gardnerella vaginalis, Trichomonas vaginalis, Candida albicans, DNA - Swab, Vagina    4. Dysuria  -     Urinalysis With Microscopic - Urine, Clean Catch    5. Acute UTI  -     nitrofurantoin, macrocrystal-monohydrate, (Macrobid) 100 MG capsule; Take 1 capsule by mouth 2 (Two) Times a Day for 5 days.  Dispense: 10 capsule; Refill: 0  -     Urine Culture - Urine, Urine, Clean Catch; Future  -     Urine Culture - Urine, Urine, Clean Catch      Cervix was difficult to visualize today.  Patient understands that if specimen not satisfactory for evaluation, she will need to see OB/GYN as they have more equipment to help better visualize cervix.  She did have a large amount of white vaginal discharge, vaginitis swab obtained to be managed according to findings.  Her urine is also positive for UTI.  Treating with Macrobid and sending urine to be cultured.     Follow Up   Return for Next scheduled follow up. Patient to notify office with any acute concerns or issues.  Patient verbalizes understanding, agrees with plan of care and has no further questions upon discharge.     Patient was given instructions and counseling regarding her condition or for health maintenance advice. Please see specific information pulled into the AVS if appropriate.     Discussed the importance of following up with any needed screening tests/labs/specialist appointments and any requested follow-up recommended by me today. Importance of maintaining follow-up discussed and patient accepts that missed appointments can delay diagnosis and potentially lead to worsening of conditions.    Part of this note may be an electronic transcription/translation of spoken language to printed text using the Dragon Dictation System.

## 2023-08-20 LAB — BACTERIA SPEC AEROBE CULT: ABNORMAL

## 2023-08-24 LAB
CYTOLOGIST CVX/VAG CYTO: ABNORMAL
CYTOLOGY CVX/VAG DOC CYTO: ABNORMAL
CYTOLOGY CVX/VAG DOC THIN PREP: ABNORMAL
DX ICD CODE: ABNORMAL
HIV 1 & 2 AB SER-IMP: ABNORMAL
HPV I/H RISK 4 DNA CVX QL PROBE+SIG AMP: POSITIVE
Lab: ABNORMAL
OTHER STN SPEC: ABNORMAL
STAT OF ADQ CVX/VAG CYTO-IMP: ABNORMAL

## 2023-08-30 DIAGNOSIS — E11.9 TYPE 2 DIABETES MELLITUS TREATED WITHOUT INSULIN: ICD-10-CM

## 2023-08-30 DIAGNOSIS — I10 ESSENTIAL HYPERTENSION: ICD-10-CM

## 2023-08-30 RX ORDER — LISINOPRIL AND HYDROCHLOROTHIAZIDE 25; 20 MG/1; MG/1
1 TABLET ORAL DAILY
Qty: 30 TABLET | Refills: 3 | Status: SHIPPED | OUTPATIENT
Start: 2023-08-30

## 2023-08-30 NOTE — TELEPHONE ENCOUNTER
Rx Refill Note  Requested Prescriptions     Pending Prescriptions Disp Refills    metFORMIN (GLUCOPHAGE) 500 MG tablet 60 tablet 0     Sig: Take 1 tablet by mouth 2 (Two) Times a Day.      Last office visit with prescribing clinician: 7/14/2023     Next office visit with prescribing clinician: 11/15/2023      Anh Anderson LPN  08/30/23, 13:56 EDT

## 2023-09-01 DIAGNOSIS — I10 ESSENTIAL HYPERTENSION: ICD-10-CM

## 2023-09-01 NOTE — TELEPHONE ENCOUNTER
Rx Refill Note  Requested Prescriptions     Pending Prescriptions Disp Refills    amLODIPine (NORVASC) 10 MG tablet 30 tablet 0     Sig: Take 1 tablet by mouth Daily.      Last office visit with prescribing clinician: 7/14/2023   Last telemedicine visit with prescribing clinician: Visit date not found   Next office visit with prescribing clinician: 11/15/2023  Last refill sent by on call Dr Geller 06/29/23, #30  Comprehensive Metabolic Panel (06/02/2023 10:41)     Dr Finch is out of the office, sent to on call Dr Ward.     Misa Ball LPN  09/01/23, 13:16 EDT

## 2023-09-02 RX ORDER — AMLODIPINE BESYLATE 10 MG/1
10 TABLET ORAL DAILY
Qty: 30 TABLET | Refills: 0 | Status: SHIPPED | OUTPATIENT
Start: 2023-09-02

## 2023-09-29 DIAGNOSIS — E11.9 TYPE 2 DIABETES MELLITUS TREATED WITHOUT INSULIN: ICD-10-CM

## 2023-09-29 DIAGNOSIS — I10 ESSENTIAL HYPERTENSION: ICD-10-CM

## 2023-09-29 RX ORDER — AMLODIPINE BESYLATE 10 MG/1
10 TABLET ORAL DAILY
Qty: 90 TABLET | Refills: 1 | Status: SHIPPED | OUTPATIENT
Start: 2023-09-29

## 2023-12-07 ENCOUNTER — OFFICE VISIT (OUTPATIENT)
Dept: FAMILY MEDICINE CLINIC | Age: 63
End: 2023-12-07
Payer: COMMERCIAL

## 2023-12-07 VITALS
OXYGEN SATURATION: 93 % | HEART RATE: 95 BPM | SYSTOLIC BLOOD PRESSURE: 170 MMHG | TEMPERATURE: 98.4 F | WEIGHT: 293 LBS | HEIGHT: 67 IN | DIASTOLIC BLOOD PRESSURE: 93 MMHG | BODY MASS INDEX: 45.99 KG/M2

## 2023-12-07 DIAGNOSIS — R05.1 ACUTE COUGH: ICD-10-CM

## 2023-12-07 DIAGNOSIS — R06.2 WHEEZING: ICD-10-CM

## 2023-12-07 DIAGNOSIS — R09.81 NASAL CONGESTION: Primary | ICD-10-CM

## 2023-12-07 DIAGNOSIS — J06.9 UPPER RESPIRATORY TRACT INFECTION, UNSPECIFIED TYPE: ICD-10-CM

## 2023-12-07 RX ORDER — AZITHROMYCIN 250 MG/1
TABLET, FILM COATED ORAL
Qty: 6 TABLET | Refills: 0 | Status: SHIPPED | OUTPATIENT
Start: 2023-12-07

## 2023-12-07 RX ORDER — TRIAMCINOLONE ACETONIDE 40 MG/ML
60 INJECTION, SUSPENSION INTRA-ARTICULAR; INTRAMUSCULAR ONCE
Status: COMPLETED | OUTPATIENT
Start: 2023-12-07 | End: 2023-12-07

## 2023-12-07 RX ORDER — GUAIFENESIN 600 MG/1
1200 TABLET, EXTENDED RELEASE ORAL 2 TIMES DAILY
Qty: 40 TABLET | Refills: 0 | Status: SHIPPED | OUTPATIENT
Start: 2023-12-07

## 2023-12-07 RX ORDER — ALBUTEROL SULFATE 2.5 MG/3ML
2.5 SOLUTION RESPIRATORY (INHALATION) EVERY 4 HOURS PRN
Qty: 15 ML | Refills: 12 | Status: SHIPPED | OUTPATIENT
Start: 2023-12-07

## 2023-12-07 RX ORDER — DEXTROMETHORPHAN HYDROBROMIDE AND PROMETHAZINE HYDROCHLORIDE 15; 6.25 MG/5ML; MG/5ML
5 SYRUP ORAL NIGHTLY PRN
Qty: 118 ML | Refills: 0 | Status: SHIPPED | OUTPATIENT
Start: 2023-12-07

## 2023-12-07 RX ADMIN — TRIAMCINOLONE ACETONIDE 60 MG: 40 INJECTION, SUSPENSION INTRA-ARTICULAR; INTRAMUSCULAR at 12:21

## 2023-12-07 NOTE — PROGRESS NOTES
"Chief Complaint  Cough (Sx started Friday ), Nasal Congestion, and Shortness of Breath    Subjective        Melany Maguire presents to [facility]  Cough  This is a new problem. The current episode started in the past 7 days. The problem has been gradually worsening. The cough is Productive of sputum. Associated symptoms include chills, nasal congestion, postnasal drip, a sore throat and shortness of breath. Pertinent negatives include no chest pain, fever or myalgias. She has tried ipratropium inhaler (Nyquil, dayquil, coricidin HBP) for the symptoms. The treatment provided mild relief. Her past medical history is significant for asthma and bronchitis.       Objective   Vital Signs:  /93 (BP Location: Left arm, Patient Position: Sitting, Cuff Size: Large Adult)   Pulse 95   Temp 98.4 °F (36.9 °C) (Temporal)   Ht 170.2 cm (67\")   Wt (!) 141 kg (309 lb 12.8 oz)   SpO2 93% Comment: room air  BMI 48.52 kg/m²   Estimated body mass index is 48.52 kg/m² as calculated from the following:    Height as of this encounter: 170.2 cm (67\").    Weight as of this encounter: 141 kg (309 lb 12.8 oz).             Physical Exam  Constitutional:       Appearance: She is obese.   HENT:      Head: Normocephalic.      Right Ear: A middle ear effusion is present.      Left Ear: A middle ear effusion is present.      Nose: Congestion present.      Right Sinus: Maxillary sinus tenderness present.      Left Sinus: Maxillary sinus tenderness present.      Mouth/Throat:      Pharynx: Posterior oropharyngeal erythema present.   Cardiovascular:      Rate and Rhythm: Normal rate and regular rhythm.   Pulmonary:      Effort: Pulmonary effort is normal. No respiratory distress.      Breath sounds: No stridor. Wheezing present. No rhonchi or rales.   Skin:     General: Skin is warm and dry.   Neurological:      Mental Status: She is alert and oriented to person, place, and time.   Psychiatric:         " Mood and Affect: Mood normal.        Result Review :          Results for orders placed or performed in visit on 12/07/23   POCT SARS-CoV-2 Antigen OSIEL + Flu    Specimen: Swab   Result Value Ref Range    SARS Antigen Not Detected Not Detected, Presumptive Negative    Influenza A Antigen OSIEL Not Detected Not Detected    Influenza B Antigen OSIEL Not Detected Not Detected    Internal Control Passed Passed    Lot Number [id number]     Expiration Date 09/13/2024               Assessment and Plan   Diagnoses and all orders for this visit:    1. Nasal congestion (Primary)  -     POCT SARS-CoV-2 Antigen OSIEL + Flu  -     guaiFENesin (Mucinex) 600 MG 12 hr tablet; Take 2 tablets by mouth 2 (Two) Times a Day.  Dispense: 40 tablet; Refill: 0    2. Upper respiratory tract infection, unspecified type  -     azithromycin (Zithromax Z-Romaine) 250 MG tablet; Take 2 tablets by mouth on day 1, then 1 tablet daily on days 2-5  Dispense: 6 tablet; Refill: 0    3. Wheezing  -     triamcinolone acetonide (KENALOG-40) injection 60 mg  -     albuterol (PROVENTIL) (2.5 MG/3ML) 0.083% nebulizer solution; Take 2.5 mg by nebulization Every 4 (Four) Hours As Needed for Wheezing.  Dispense: 15 mL; Refill: 12  -     Home Nebulizer Accessories    4. Acute cough  -     promethazine-dextromethorphan (PROMETHAZINE-DM) 6.25-15 MG/5ML syrup; Take 5 mL by mouth At Night As Needed for Cough.  Dispense: 118 mL; Refill: 0             Follow Up   Return if symptoms worsen or fail to improve.  Patient was given instructions and counseling regarding her condition or for health maintenance advice. Please see specific information pulled into the AVS if appropriate.

## 2024-01-16 DIAGNOSIS — I10 ESSENTIAL HYPERTENSION: ICD-10-CM

## 2024-01-16 DIAGNOSIS — E11.9 TYPE 2 DIABETES MELLITUS TREATED WITHOUT INSULIN: ICD-10-CM

## 2024-01-16 RX ORDER — LISINOPRIL AND HYDROCHLOROTHIAZIDE 25; 20 MG/1; MG/1
1 TABLET ORAL DAILY
Qty: 30 TABLET | Refills: 0 | Status: SHIPPED | OUTPATIENT
Start: 2024-01-16

## 2024-01-26 DIAGNOSIS — G25.81 RLS (RESTLESS LEGS SYNDROME): ICD-10-CM

## 2024-01-26 RX ORDER — MIRTAZAPINE 30 MG/1
30 TABLET, FILM COATED ORAL NIGHTLY
Qty: 90 TABLET | Refills: 1 | Status: SHIPPED | OUTPATIENT
Start: 2024-01-26

## 2024-01-26 NOTE — TELEPHONE ENCOUNTER
Rx Refill Note  Requested Prescriptions     Pending Prescriptions Disp Refills    mirtazapine (REMERON) 30 MG tablet [Pharmacy Med Name: MIRTAZAPINE 30 MG TABLET] 30 tablet      Sig: TAKE ONE TABLET BY MOUTH ONCE NIGHTLY    pramipexole (MIRAPEX) 1 MG tablet [Pharmacy Med Name: PRAMIPEXOLE 1 MG TABLET] 30 tablet      Sig: TAKE ONE TABLET BY MOUTH EVERY NIGHT AT BEDTIME      Last office visit with prescribing clinician: 7/14/2023   Last telemedicine visit with prescribing clinician: Visit date not found   Next office visit with prescribing clinician: 1/31/2024    Atypical Antidepressants Protocol Failed      Antiparkinson Dopaminergic Meds Failed     Misa Ball LPN  01/26/24, 08:59 EST

## 2024-01-27 RX ORDER — PRAMIPEXOLE DIHYDROCHLORIDE 1 MG/1
1 TABLET ORAL
Qty: 90 TABLET | Refills: 3 | Status: SHIPPED | OUTPATIENT
Start: 2024-01-27

## 2024-01-31 ENCOUNTER — OFFICE VISIT (OUTPATIENT)
Dept: FAMILY MEDICINE CLINIC | Age: 64
End: 2024-01-31
Payer: COMMERCIAL

## 2024-01-31 ENCOUNTER — LAB (OUTPATIENT)
Dept: LAB | Facility: HOSPITAL | Age: 64
End: 2024-01-31
Payer: COMMERCIAL

## 2024-01-31 VITALS
HEIGHT: 67 IN | DIASTOLIC BLOOD PRESSURE: 98 MMHG | SYSTOLIC BLOOD PRESSURE: 149 MMHG | BODY MASS INDEX: 45.99 KG/M2 | HEART RATE: 98 BPM | WEIGHT: 293 LBS

## 2024-01-31 DIAGNOSIS — I10 ESSENTIAL HYPERTENSION: Primary | ICD-10-CM

## 2024-01-31 DIAGNOSIS — J45.20 MILD INTERMITTENT ASTHMA, UNCOMPLICATED: ICD-10-CM

## 2024-01-31 DIAGNOSIS — E11.9 TYPE 2 DIABETES MELLITUS TREATED WITHOUT INSULIN: ICD-10-CM

## 2024-01-31 DIAGNOSIS — G25.81 RLS (RESTLESS LEGS SYNDROME): ICD-10-CM

## 2024-01-31 DIAGNOSIS — R06.83 SNORING: ICD-10-CM

## 2024-01-31 PROBLEM — Z00.00 ANNUAL PHYSICAL EXAM: Status: RESOLVED | Noted: 2022-03-30 | Resolved: 2024-01-31

## 2024-01-31 LAB
ALBUMIN SERPL-MCNC: 4.4 G/DL (ref 3.5–5.2)
ALBUMIN/GLOB SERPL: 1.4 G/DL
ALP SERPL-CCNC: 71 U/L (ref 39–117)
ALT SERPL W P-5'-P-CCNC: 45 U/L (ref 1–33)
ANION GAP SERPL CALCULATED.3IONS-SCNC: 14 MMOL/L (ref 5–15)
AST SERPL-CCNC: 38 U/L (ref 1–32)
BASOPHILS # BLD AUTO: 0.03 10*3/MM3 (ref 0–0.2)
BASOPHILS NFR BLD AUTO: 0.3 % (ref 0–1.5)
BILIRUB SERPL-MCNC: 0.4 MG/DL (ref 0–1.2)
BUN SERPL-MCNC: 19 MG/DL (ref 8–23)
BUN/CREAT SERPL: 19.8 (ref 7–25)
CALCIUM SPEC-SCNC: 10.1 MG/DL (ref 8.6–10.5)
CHLORIDE SERPL-SCNC: 93 MMOL/L (ref 98–107)
CHOLEST SERPL-MCNC: 178 MG/DL (ref 0–200)
CO2 SERPL-SCNC: 32 MMOL/L (ref 22–29)
CREAT SERPL-MCNC: 0.96 MG/DL (ref 0.57–1)
DEPRECATED RDW RBC AUTO: 41.9 FL (ref 37–54)
EGFRCR SERPLBLD CKD-EPI 2021: 66.6 ML/MIN/1.73
EOSINOPHIL # BLD AUTO: 0.23 10*3/MM3 (ref 0–0.4)
EOSINOPHIL NFR BLD AUTO: 2.6 % (ref 0.3–6.2)
ERYTHROCYTE [DISTWIDTH] IN BLOOD BY AUTOMATED COUNT: 12.9 % (ref 12.3–15.4)
GLOBULIN UR ELPH-MCNC: 3.1 GM/DL
GLUCOSE SERPL-MCNC: 246 MG/DL (ref 65–99)
HBA1C MFR BLD: 10.6 % (ref 4.8–5.6)
HCT VFR BLD AUTO: 48.6 % (ref 34–46.6)
HDLC SERPL-MCNC: 54 MG/DL (ref 40–60)
HGB BLD-MCNC: 15.5 G/DL (ref 12–15.9)
IMM GRANULOCYTES # BLD AUTO: 0.07 10*3/MM3 (ref 0–0.05)
IMM GRANULOCYTES NFR BLD AUTO: 0.8 % (ref 0–0.5)
LDLC SERPL CALC-MCNC: 87 MG/DL (ref 0–100)
LDLC/HDLC SERPL: 1.48 {RATIO}
LYMPHOCYTES # BLD AUTO: 2.1 10*3/MM3 (ref 0.7–3.1)
LYMPHOCYTES NFR BLD AUTO: 23.5 % (ref 19.6–45.3)
MCH RBC QN AUTO: 28.4 PG (ref 26.6–33)
MCHC RBC AUTO-ENTMCNC: 31.9 G/DL (ref 31.5–35.7)
MCV RBC AUTO: 89 FL (ref 79–97)
MONOCYTES # BLD AUTO: 0.66 10*3/MM3 (ref 0.1–0.9)
MONOCYTES NFR BLD AUTO: 7.4 % (ref 5–12)
NEUTROPHILS NFR BLD AUTO: 5.83 10*3/MM3 (ref 1.7–7)
NEUTROPHILS NFR BLD AUTO: 65.4 % (ref 42.7–76)
PLATELET # BLD AUTO: 197 10*3/MM3 (ref 140–450)
PMV BLD AUTO: 8.6 FL (ref 6–12)
POTASSIUM SERPL-SCNC: 5.3 MMOL/L (ref 3.5–5.2)
PROT SERPL-MCNC: 7.5 G/DL (ref 6–8.5)
RBC # BLD AUTO: 5.46 10*6/MM3 (ref 3.77–5.28)
SODIUM SERPL-SCNC: 139 MMOL/L (ref 136–145)
T4 FREE SERPL-MCNC: 1.41 NG/DL (ref 0.93–1.7)
TRIGL SERPL-MCNC: 220 MG/DL (ref 0–150)
TSH SERPL DL<=0.05 MIU/L-ACNC: 5.15 UIU/ML (ref 0.27–4.2)
VLDLC SERPL-MCNC: 37 MG/DL (ref 5–40)
WBC NRBC COR # BLD AUTO: 8.92 10*3/MM3 (ref 3.4–10.8)

## 2024-01-31 PROCEDURE — 83036 HEMOGLOBIN GLYCOSYLATED A1C: CPT | Performed by: FAMILY MEDICINE

## 2024-01-31 PROCEDURE — 36415 COLL VENOUS BLD VENIPUNCTURE: CPT | Performed by: FAMILY MEDICINE

## 2024-01-31 PROCEDURE — 84439 ASSAY OF FREE THYROXINE: CPT | Performed by: FAMILY MEDICINE

## 2024-01-31 PROCEDURE — 80050 GENERAL HEALTH PANEL: CPT | Performed by: FAMILY MEDICINE

## 2024-01-31 PROCEDURE — 80061 LIPID PANEL: CPT | Performed by: FAMILY MEDICINE

## 2024-01-31 RX ORDER — LISINOPRIL AND HYDROCHLOROTHIAZIDE 25; 20 MG/1; MG/1
1 TABLET ORAL 2 TIMES DAILY
Qty: 180 TABLET | Refills: 1 | Status: SHIPPED | OUTPATIENT
Start: 2024-01-31

## 2024-01-31 NOTE — ASSESSMENT & PLAN NOTE
Hypertension is worsening.  Weight loss.  Medication changes per orders.  Blood pressure will be reassessed in 4 weeks  WILL INCREASE MEDS AS NOTED.  MAY NEED A BETA BLOCKER .

## 2024-01-31 NOTE — PROGRESS NOTES
Chief Complaint  Diabetes (6 months)    Subjective          Melany Maguire presents to [facility]  History of Present Illness  --TOLERATING BLOOD PRESSURE MEDICATION WITHOUT APPARENT SIDE EFFECTS, GETTING SIMILAR READINGS AT HOME  --LAST HGA1C WAS 8.3 %, DUE FOR A RECHECK, TOLERATING THE METFORMIN WELL  --RLS DID NOT IMPROVE WITH THE INCREASED DOSING OF THE MIRAPEX, ALSO CRAMPING, NORMAL FOOT EXAM AT LAST VISIT  --DID NOT SEE SLEEP MEDICINE DUE TO THE SNORING, NEEDS APPT RESCDHEDULED  --BREATHING IS STABLE ON CURRENT INHALED MEDS        Allergies   Allergen Reactions    Penicillins Hives        Health Maintenance Due   Topic Date Due    URINE MICROALBUMIN  Never done    TDAP/TD VACCINES (1 - Tdap) Never done    HEPATITIS C SCREENING  Never done    BMI FOLLOWUP  08/05/2022    DIABETIC EYE EXAM  02/01/2023    COVID-19 Vaccine (4 - 2023-24 season) 09/01/2023    ZOSTER VACCINE (2 of 2) 09/08/2023    HEMOGLOBIN A1C  12/02/2023        Current Outpatient Medications on File Prior to Visit   Medication Sig    albuterol (PROVENTIL) (2.5 MG/3ML) 0.083% nebulizer solution Take 2.5 mg by nebulization Every 4 (Four) Hours As Needed for Wheezing.    amLODIPine (NORVASC) 10 MG tablet TAKE 1 TABLET BY MOUTH DAILY    Bioflavonoid Products (Vitamin C Plus) 1000 MG tablet Take  by mouth.    Black Elderberry,Berry-Flower, 575 MG capsule Take 2 capsules by mouth Daily.    guaiFENesin (Mucinex) 600 MG 12 hr tablet Take 2 tablets by mouth 2 (Two) Times a Day.    loratadine (CLARITIN) 10 MG tablet Take 1 tablet by mouth Daily.    Magnesium 500 MG tablet Take  by mouth.    metFORMIN (GLUCOPHAGE) 500 MG tablet TAKE 1 TABLET BY MOUTH TWICE A DAY    mirtazapine (REMERON) 30 MG tablet TAKE ONE TABLET BY MOUTH ONCE NIGHTLY    multivitamin with minerals tablet tablet Take 1 tablet by mouth Daily.    pramipexole (MIRAPEX) 1 MG tablet TAKE ONE TABLET BY MOUTH EVERY NIGHT AT BEDTIME    ProAir  (90 Base)  "MCG/ACT inhaler Inhale 2 puffs Every 4 (Four) Hours As Needed for Wheezing.    vitamin E 100 UNIT capsule Take 1 capsule by mouth Daily.    [DISCONTINUED] lisinopril-hydrochlorothiazide (PRINZIDE,ZESTORETIC) 20-25 MG per tablet TAKE 1 TABLET BY MOUTH DAILY    [DISCONTINUED] azithromycin (Zithromax Z-Romaine) 250 MG tablet Take 2 tablets by mouth on day 1, then 1 tablet daily on days 2-5    [DISCONTINUED] promethazine-dextromethorphan (PROMETHAZINE-DM) 6.25-15 MG/5ML syrup Take 5 mL by mouth At Night As Needed for Cough.     No current facility-administered medications on file prior to visit.       Immunization History   Administered Date(s) Administered    COVID-19 (MODERNA) 1st,2nd,3rd Dose Monovalent 11/30/2021    COVID-19 (PFIZER) Purple Cap Monovalent 01/14/2021, 02/04/2021    Shingrix 07/14/2023       Review of Systems   Constitutional:  Positive for fatigue. Negative for activity change, appetite change, chills and fever.   HENT:  Negative for congestion, ear pain, rhinorrhea and sore throat.    Respiratory:  Negative for cough and shortness of breath.    Cardiovascular:  Negative for chest pain, palpitations and leg swelling.   Gastrointestinal:  Negative for abdominal pain, constipation, diarrhea, nausea and vomiting.   Musculoskeletal:  Negative for arthralgias and myalgias.   Neurological:  Negative for headache.        Objective     /98 (BP Location: Right arm, Patient Position: Sitting)   Pulse 98   Ht 170.2 cm (67\")   Wt (!) 140 kg (308 lb 6.4 oz)   BMI 48.30 kg/m²       Physical Exam  Vitals and nursing note reviewed.   Constitutional:       General: She is not in acute distress.     Appearance: Normal appearance.   Cardiovascular:      Rate and Rhythm: Normal rate and regular rhythm.      Heart sounds: Normal heart sounds. No murmur heard.  Pulmonary:      Effort: Pulmonary effort is normal.      Breath sounds: Normal breath sounds.   Abdominal:      Palpations: Abdomen is soft.      Tenderness: " There is no abdominal tenderness.   Musculoskeletal:      Cervical back: Neck supple.      Right lower leg: No edema.      Left lower leg: No edema.   Lymphadenopathy:      Cervical: No cervical adenopathy.   Neurological:      General: No focal deficit present.      Mental Status: She is alert.      Cranial Nerves: No cranial nerve deficit.      Coordination: Coordination normal.      Gait: Gait normal.   Psychiatric:         Mood and Affect: Mood normal.         Behavior: Behavior normal.         Result Review :                             Assessment and Plan      Diagnoses and all orders for this visit:    1. Essential hypertension (Primary)  Assessment & Plan:  Hypertension is worsening.  Weight loss.  Medication changes per orders.  Blood pressure will be reassessed in 4 weeks  WILL INCREASE MEDS AS NOTED.  MAY NEED A BETA BLOCKER .    Orders:  -     CBC & Differential  -     Comprehensive Metabolic Panel  -     TSH Rfx On Abnormal To Free T4  -     lisinopril-hydrochlorothiazide (PRINZIDE,ZESTORETIC) 20-25 MG per tablet; Take 1 tablet by mouth 2 (Two) Times a Day.  Dispense: 180 tablet; Refill: 1    2. Mild intermittent asthma, uncomplicated  Assessment & Plan:  Asthma is improving with treatment.  The patient is experiencing no daytime asthma symptoms. She is experiencing no nighttime asthma symptoms.  Discussed monitoring symptoms and use of quick-relief medications and contacting us early in the course of exacerbations.          3. Type 2 diabetes mellitus treated without insulin  Assessment & Plan:  Diabetes is unchanged.   Continue current treatment regimen.  Diabetes will be reassessed in 3 months  NOT AT GOAL, WILL RECHECK LABS AND PROCEED ACCORDINGLY .    Orders:  -     Hemoglobin A1c  -     Lipid Panel    4. RLS (restless legs syndrome)  Assessment & Plan:  NO IMPROVEMENT, WILL SEND TO NEUROLOGY FOR FURTHER EVAL     Orders:  -     Ambulatory Referral to Neurology    5. Snoring  -     Ambulatory  Referral to Sleep Medicine            Follow Up     Return in about 6 months (around 7/31/2024).    Patient was given instructions and counseling regarding her condition or for health maintenance advice. Please see specific information pulled into the AVS if appropriate.

## 2024-01-31 NOTE — ASSESSMENT & PLAN NOTE
Diabetes is unchanged.   Continue current treatment regimen.  Diabetes will be reassessed in 3 months  NOT AT GOAL, WILL RECHECK LABS AND PROCEED ACCORDINGLY .

## 2024-02-12 DIAGNOSIS — G25.81 RLS (RESTLESS LEGS SYNDROME): ICD-10-CM

## 2024-02-12 DIAGNOSIS — E11.9 TYPE 2 DIABETES MELLITUS TREATED WITHOUT INSULIN: ICD-10-CM

## 2024-02-12 RX ORDER — PRAMIPEXOLE DIHYDROCHLORIDE 1 MG/1
1 TABLET ORAL 2 TIMES DAILY
Qty: 90 TABLET | Refills: 3 | Status: SHIPPED | OUTPATIENT
Start: 2024-02-12

## 2024-02-13 DIAGNOSIS — I10 ESSENTIAL HYPERTENSION: ICD-10-CM

## 2024-02-13 DIAGNOSIS — E11.9 TYPE 2 DIABETES MELLITUS TREATED WITHOUT INSULIN: ICD-10-CM

## 2024-02-19 RX ORDER — LISINOPRIL AND HYDROCHLOROTHIAZIDE 25; 20 MG/1; MG/1
1 TABLET ORAL DAILY
Qty: 30 TABLET | OUTPATIENT
Start: 2024-02-19

## 2024-02-19 NOTE — TELEPHONE ENCOUNTER
Rx Refill Note  Requested Prescriptions     Pending Prescriptions Disp Refills    metFORMIN (GLUCOPHAGE) 500 MG tablet [Pharmacy Med Name: metFORMIN  MG TABLET] 60 tablet      Sig: TAKE 1 TABLET BY MOUTH TWICE A DAY      Last office visit with prescribing clinician: 1/31/2024   Last telemedicine visit with prescribing clinician: Visit date not found   Next office visit with prescribing clinician: 2/13/2024  Hemoglobin A1c (01/31/2024 09:24)     Refill sent on 02/12/24, #380  Duplicate refill request.     Misa Ball LPN  02/19/24, 12:03 EST

## 2024-02-19 NOTE — TELEPHONE ENCOUNTER
Rx Refill Note  Requested Prescriptions     Pending Prescriptions Disp Refills    lisinopril-hydrochlorothiazide (PRINZIDE,ZESTORETIC) 20-25 MG per tablet [Pharmacy Med Name: LISINOPRIL-HCTZ 20-25 MG TAB] 30 tablet      Sig: TAKE 1 TABLET BY MOUTH DAILY      Last office visit with prescribing clinician: 1/31/2024   Last telemedicine visit with prescribing clinician: Visit date not found   Next office visit with prescribing clinician: 7/18/2024  Comprehensive Metabolic Panel (01/31/2024 09:24)     Refill sent too soon, last refill sent 01/31/24, #180, 1 refill.   Denied duplicate request.       Misa Ball LPN  02/19/24, 12:02 EST

## 2024-04-08 DIAGNOSIS — E78.1 HYPERTRIGLYCERIDEMIA: ICD-10-CM

## 2024-04-08 DIAGNOSIS — E11.9 TYPE 2 DIABETES MELLITUS TREATED WITHOUT INSULIN: Primary | ICD-10-CM

## 2024-04-08 DIAGNOSIS — R74.8 ABNORMAL LIVER ENZYMES: ICD-10-CM

## 2024-04-08 DIAGNOSIS — R94.6 ABNORMAL THYROID FUNCTION TEST: ICD-10-CM

## 2024-04-19 DIAGNOSIS — I10 ESSENTIAL HYPERTENSION: ICD-10-CM

## 2024-04-19 RX ORDER — AMLODIPINE BESYLATE 10 MG/1
10 TABLET ORAL DAILY
Qty: 90 TABLET | Refills: 1 | Status: SHIPPED | OUTPATIENT
Start: 2024-04-19

## 2024-04-19 NOTE — TELEPHONE ENCOUNTER
Rx Refill Note  Requested Prescriptions     Pending Prescriptions Disp Refills    amLODIPine (NORVASC) 10 MG tablet 90 tablet 1     Sig: Take 1 tablet by mouth Daily.      Last office visit with prescribing clinician: 1/31/2024   Last telemedicine visit with prescribing clinician: Visit date not found   Next office visit with prescribing clinician: 7/18/2024  Comprehensive Metabolic Panel (01/31/2024 09:24)     Misa Ball LPN  04/19/24, 09:38 EDT

## 2024-05-10 ENCOUNTER — TELEPHONE (OUTPATIENT)
Dept: FAMILY MEDICINE CLINIC | Age: 64
End: 2024-05-10
Payer: COMMERCIAL

## 2024-05-13 ENCOUNTER — LAB (OUTPATIENT)
Dept: LAB | Facility: HOSPITAL | Age: 64
End: 2024-05-13
Payer: COMMERCIAL

## 2024-05-13 DIAGNOSIS — R74.8 ABNORMAL LIVER ENZYMES: ICD-10-CM

## 2024-05-13 DIAGNOSIS — R94.6 ABNORMAL THYROID FUNCTION TEST: ICD-10-CM

## 2024-05-13 DIAGNOSIS — E11.9 TYPE 2 DIABETES MELLITUS TREATED WITHOUT INSULIN: ICD-10-CM

## 2024-05-13 DIAGNOSIS — E78.1 HYPERTRIGLYCERIDEMIA: ICD-10-CM

## 2024-05-13 LAB
ALBUMIN SERPL-MCNC: 4 G/DL (ref 3.5–5.2)
ALBUMIN/GLOB SERPL: 1.2 G/DL
ALP SERPL-CCNC: 67 U/L (ref 39–117)
ALT SERPL W P-5'-P-CCNC: 48 U/L (ref 1–33)
ANION GAP SERPL CALCULATED.3IONS-SCNC: 9.9 MMOL/L (ref 5–15)
AST SERPL-CCNC: 34 U/L (ref 1–32)
BILIRUB SERPL-MCNC: 0.3 MG/DL (ref 0–1.2)
BUN SERPL-MCNC: 21 MG/DL (ref 8–23)
BUN/CREAT SERPL: 23.1 (ref 7–25)
CALCIUM SPEC-SCNC: 9.2 MG/DL (ref 8.6–10.5)
CHLORIDE SERPL-SCNC: 96 MMOL/L (ref 98–107)
CHOLEST SERPL-MCNC: 164 MG/DL (ref 0–200)
CO2 SERPL-SCNC: 30.1 MMOL/L (ref 22–29)
CREAT SERPL-MCNC: 0.91 MG/DL (ref 0.57–1)
EGFRCR SERPLBLD CKD-EPI 2021: 71 ML/MIN/1.73
GLOBULIN UR ELPH-MCNC: 3.4 GM/DL
GLUCOSE SERPL-MCNC: 248 MG/DL (ref 65–99)
HBA1C MFR BLD: 9.9 % (ref 4.8–5.6)
HDLC SERPL-MCNC: 49 MG/DL (ref 40–60)
LDLC SERPL CALC-MCNC: 71 MG/DL (ref 0–100)
LDLC/HDLC SERPL: 1.23 {RATIO}
POTASSIUM SERPL-SCNC: 4.4 MMOL/L (ref 3.5–5.2)
PROT SERPL-MCNC: 7.4 G/DL (ref 6–8.5)
SODIUM SERPL-SCNC: 136 MMOL/L (ref 136–145)
TRIGL SERPL-MCNC: 273 MG/DL (ref 0–150)
TSH SERPL DL<=0.05 MIU/L-ACNC: 3.13 UIU/ML (ref 0.27–4.2)
VLDLC SERPL-MCNC: 44 MG/DL (ref 5–40)

## 2024-05-13 PROCEDURE — 80053 COMPREHEN METABOLIC PANEL: CPT

## 2024-05-13 PROCEDURE — 36415 COLL VENOUS BLD VENIPUNCTURE: CPT

## 2024-05-13 PROCEDURE — 83036 HEMOGLOBIN GLYCOSYLATED A1C: CPT

## 2024-05-13 PROCEDURE — 80061 LIPID PANEL: CPT

## 2024-05-13 PROCEDURE — 84443 ASSAY THYROID STIM HORMONE: CPT

## 2024-05-14 DIAGNOSIS — E78.00 ELEVATED CHOLESTEROL: ICD-10-CM

## 2024-05-14 DIAGNOSIS — E11.9 TYPE 2 DIABETES MELLITUS TREATED WITHOUT INSULIN: Primary | ICD-10-CM

## 2024-05-20 DIAGNOSIS — E11.9 TYPE 2 DIABETES MELLITUS TREATED WITHOUT INSULIN: ICD-10-CM

## 2024-06-03 ENCOUNTER — OFFICE VISIT (OUTPATIENT)
Dept: FAMILY MEDICINE CLINIC | Age: 64
End: 2024-06-03
Payer: COMMERCIAL

## 2024-06-03 VITALS
TEMPERATURE: 98.9 F | SYSTOLIC BLOOD PRESSURE: 148 MMHG | DIASTOLIC BLOOD PRESSURE: 82 MMHG | HEART RATE: 102 BPM | WEIGHT: 293 LBS | HEIGHT: 67 IN | BODY MASS INDEX: 45.99 KG/M2 | OXYGEN SATURATION: 95 %

## 2024-06-03 DIAGNOSIS — S49.91XA INJURY OF RIGHT SHOULDER, INITIAL ENCOUNTER: ICD-10-CM

## 2024-06-03 DIAGNOSIS — M62.838 MUSCLE SPASM: ICD-10-CM

## 2024-06-03 DIAGNOSIS — R10.9 FLANK PAIN: ICD-10-CM

## 2024-06-03 DIAGNOSIS — N30.01 ACUTE CYSTITIS WITH HEMATURIA: Primary | ICD-10-CM

## 2024-06-03 LAB
BACTERIA UR QL AUTO: ABNORMAL /HPF
BILIRUB UR QL STRIP: NEGATIVE
CLARITY UR: ABNORMAL
COLOR UR: YELLOW
GLUCOSE UR STRIP-MCNC: ABNORMAL MG/DL
HGB UR QL STRIP.AUTO: ABNORMAL
KETONES UR QL STRIP: NEGATIVE
LEUKOCYTE ESTERASE UR QL STRIP.AUTO: NEGATIVE
NITRITE UR QL STRIP: POSITIVE
PH UR STRIP.AUTO: 7 [PH] (ref 5–8)
PROT UR QL STRIP: ABNORMAL
RBC # UR STRIP: ABNORMAL /HPF
REF LAB TEST METHOD: ABNORMAL
SP GR UR STRIP: 1.02 (ref 1–1.03)
SQUAMOUS #/AREA URNS HPF: ABNORMAL /HPF
UROBILINOGEN UR QL STRIP: ABNORMAL
WBC # UR STRIP: ABNORMAL /HPF

## 2024-06-03 PROCEDURE — 81001 URINALYSIS AUTO W/SCOPE: CPT | Performed by: NURSE PRACTITIONER

## 2024-06-03 PROCEDURE — 99214 OFFICE O/P EST MOD 30 MIN: CPT | Performed by: NURSE PRACTITIONER

## 2024-06-03 RX ORDER — SULFAMETHOXAZOLE AND TRIMETHOPRIM 800; 160 MG/1; MG/1
1 TABLET ORAL 2 TIMES DAILY
Qty: 14 TABLET | Refills: 0 | Status: SHIPPED | OUTPATIENT
Start: 2024-06-03 | End: 2024-06-10

## 2024-06-03 RX ORDER — METHOCARBAMOL 500 MG/1
500 TABLET, FILM COATED ORAL 4 TIMES DAILY
Qty: 40 TABLET | Refills: 1 | Status: SHIPPED | OUTPATIENT
Start: 2024-06-03

## 2024-06-03 NOTE — PROGRESS NOTES
"Chief Complaint  Flank Pain (Sx started 2 weeks ago ), dark urine, and Shoulder Pain (Right - pt states she fell 3 weeks ago )    Subjective        Melany Maguire presents to [facility]  Flank Pain  This is a new problem. The current episode started 1 to 4 weeks ago. The pain is present in the lumbar spine. The quality of the pain is described as stabbing. The pain does not radiate. The symptoms are aggravated by bending and twisting. Associated symptoms include dysuria. Pertinent negatives include no numbness or tingling. Risk factors include obesity. Treatments tried: tylenol.   Shoulder Injury   The incident occurred in the yard. The right shoulder is affected. The incident occurred more than 1 week ago. The injury mechanism was a fall. The quality of the pain is described as aching. The pain radiates to the right arm. Pertinent negatives include no numbness or tingling. The symptoms are aggravated by movement. She has tried acetaminophen for the symptoms. The treatment provided moderate relief.       Objective   Vital Signs:  /82 (BP Location: Right arm, Patient Position: Sitting, Cuff Size: Large Adult)   Pulse 102   Temp 98.9 °F (37.2 °C) (Temporal)   Ht 170.2 cm (67\")   Wt (!) 144 kg (317 lb 9.6 oz)   SpO2 95% Comment: room air  BMI 49.74 kg/m²   Estimated body mass index is 49.74 kg/m² as calculated from the following:    Height as of this encounter: 170.2 cm (67\").    Weight as of this encounter: 144 kg (317 lb 9.6 oz).             Physical Exam  HENT:      Head: Normocephalic.   Cardiovascular:      Rate and Rhythm: Normal rate and regular rhythm.   Pulmonary:      Effort: Pulmonary effort is normal. No respiratory distress.      Breath sounds: Normal breath sounds. No stridor. No wheezing, rhonchi or rales.   Abdominal:      Tenderness: There is no right CVA tenderness or left CVA tenderness.   Musculoskeletal:      Right shoulder: Tenderness present. No " swelling or crepitus. Normal range of motion. Normal pulse.   Skin:     General: Skin is warm and dry.   Neurological:      Mental Status: She is alert and oriented to person, place, and time.   Psychiatric:         Mood and Affect: Mood normal.        Result Review :            Results for orders placed or performed in visit on 06/03/24   Urinalysis without microscopic (no culture) - Urine, Clean Catch    Specimen: Urine, Clean Catch   Result Value Ref Range    Color, UA Yellow Yellow, Straw    Appearance, UA Cloudy (A) Clear    pH, UA 7.0 5.0 - 8.0    Specific Gravity, UA 1.025 1.005 - 1.030    Glucose,  mg/dL (Trace) (A) Negative    Ketones, UA Negative Negative    Bilirubin, UA Negative Negative    Blood, UA Moderate (2+) (A) Negative    Protein,  mg/dL (2+) (A) Negative    Leuk Esterase, UA Negative Negative    Nitrite, UA Positive (A) Negative    Urobilinogen, UA 0.2 E.U./dL 0.2 - 1.0 E.U./dL   Urinalysis, Microscopic Only - Urine, Clean Catch    Specimen: Urine, Clean Catch   Result Value Ref Range    RBC, UA 0-2 None Seen, 0-2 /HPF    WBC, UA 6-10 (A) None Seen, 0-2 /HPF    Bacteria, UA 4+ (A) None Seen /HPF    Squamous Epithelial Cells, UA 3-6 (A) None Seen, 0-2 /HPF    Methodology Manual Light Microscopy               Assessment and Plan     Diagnoses and all orders for this visit:    1. Acute cystitis with hematuria (Primary)  -     sulfamethoxazole-trimethoprim (Bactrim DS) 800-160 MG per tablet; Take 1 tablet by mouth 2 (Two) Times a Day for 7 days.  Dispense: 14 tablet; Refill: 0    2. Flank pain  -     Urinalysis With Microscopic - Urine, Clean Catch; Future  -     Urinalysis With Microscopic - Urine, Clean Catch    3. Injury of right shoulder, initial encounter  Comments:  negative assessment, alternate ice and heat, tylenol and muscle relaxer as needed, F/U if pain persist  Orders:  -     methocarbamol (ROBAXIN) 500 MG tablet; Take 1 tablet by mouth 4 (Four) Times a Day.  Dispense: 40  tablet; Refill: 1    4. Muscle spasm  -     methocarbamol (ROBAXIN) 500 MG tablet; Take 1 tablet by mouth 4 (Four) Times a Day.  Dispense: 40 tablet; Refill: 1             Follow Up     Return in 6 weeks (on 7/18/2024), or if symptoms worsen or fail to improve, for Next scheduled follow up with PCP.  Patient was given instructions and counseling regarding her condition or for health maintenance advice. Please see specific information pulled into the AVS if appropriate.

## 2024-06-17 ENCOUNTER — OFFICE VISIT (OUTPATIENT)
Dept: FAMILY MEDICINE CLINIC | Age: 64
End: 2024-06-17
Payer: COMMERCIAL

## 2024-06-17 VITALS
WEIGHT: 293 LBS | HEIGHT: 67 IN | SYSTOLIC BLOOD PRESSURE: 137 MMHG | HEART RATE: 96 BPM | DIASTOLIC BLOOD PRESSURE: 89 MMHG | BODY MASS INDEX: 45.99 KG/M2

## 2024-06-17 DIAGNOSIS — E11.9 TYPE 2 DIABETES MELLITUS TREATED WITHOUT INSULIN: ICD-10-CM

## 2024-06-17 DIAGNOSIS — S30.0XXD CONTUSION OF LOWER BACK, SUBSEQUENT ENCOUNTER: ICD-10-CM

## 2024-06-17 DIAGNOSIS — I10 ESSENTIAL HYPERTENSION: Primary | ICD-10-CM

## 2024-06-17 PROBLEM — S30.0XXA CONTUSION OF LOWER BACK: Status: ACTIVE | Noted: 2024-06-17

## 2024-06-17 PROCEDURE — 99214 OFFICE O/P EST MOD 30 MIN: CPT | Performed by: FAMILY MEDICINE

## 2024-06-17 RX ORDER — DICLOFENAC SODIUM 75 MG/1
75 TABLET, DELAYED RELEASE ORAL 2 TIMES DAILY
Qty: 60 TABLET | Refills: 1 | Status: SHIPPED | OUTPATIENT
Start: 2024-06-17 | End: 2024-08-16

## 2024-06-17 RX ORDER — METHOCARBAMOL 500 MG/1
1000 TABLET, FILM COATED ORAL NIGHTLY PRN
Qty: 40 TABLET | Refills: 1 | Status: SHIPPED | OUTPATIENT
Start: 2024-06-17

## 2024-06-17 NOTE — ASSESSMENT & PLAN NOTE
CT REPORT REVIEWED.  PROBABLE CONTUSION OF THE PARASPINOUS MUSCLES.  WILL START AN NSAID AND ADVISE THE MUSCLE RELAXER AT HS.  BEGIN STRTCHES, HEAT AND COL PACKS.  CNOSIDER REFERRAL TO [place]

## 2024-06-17 NOTE — ASSESSMENT & PLAN NOTE
Diabetes is stable.   Continue current treatment regimen.  Diabetes will be reassessed in 6 months  NOT AT GOAL BUT IMPROVED.  WILL RECHECK AT NEXT VISIT

## 2024-06-17 NOTE — PROGRESS NOTES
Chief Complaint  Hospital Follow Up Visit (Patient went to [facility] ER on 6-13-24 for left flank pain)    Subjective          Melany Maguire presents to [facility]  History of Present Illness  --TOLERATING BLOOD PRESSURE MEDICATION WITHOUT APPARENT SIDE EFFECTS  --LAST HGA1C WAS DOWN TO 9.6 % ON CURRENT MEDS  --FELL ONE MONTH AGO STRIKING HER LEFT LOWR BACK AGAINST A WHEELBARROW HANDLE.  NEGATIVE CT OF ABD / PELVIS IN THE ER.  NORMAL U/A.  MUSCLE RELAXER HELPS A LITTLE.  NO CHANGE IN BOWEL OR BLADDER HABITS.  NO RADIATION OF THE PAIN        Allergies   Allergen Reactions    Penicillins Hives        Health Maintenance Due   Topic Date Due    URINE MICROALBUMIN  Never done    TDAP/TD VACCINES (1 - Tdap) Never done    HEPATITIS C SCREENING  Never done    BMI FOLLOWUP  08/05/2022    DIABETIC EYE EXAM  02/01/2023    COVID-19 Vaccine (4 - 2023-24 season) 09/01/2023    ZOSTER VACCINE (2 of 2) 09/08/2023        Current Outpatient Medications on File Prior to Visit   Medication Sig    albuterol (PROVENTIL) (2.5 MG/3ML) 0.083% nebulizer solution Take 2.5 mg by nebulization Every 4 (Four) Hours As Needed for Wheezing.    amLODIPine (NORVASC) 10 MG tablet Take 1 tablet by mouth Daily.    Bioflavonoid Products (Vitamin C Plus) 1000 MG tablet Take  by mouth.    Black Elderberry,Berry-Flower, 575 MG capsule Take 2 capsules by mouth Daily.    lisinopril-hydrochlorothiazide (PRINZIDE,ZESTORETIC) 20-25 MG per tablet Take 1 tablet by mouth 2 (Two) Times a Day. (Patient taking differently: Take 1 tablet by mouth Daily.)    loratadine (CLARITIN) 10 MG tablet Take 1 tablet by mouth Daily.    Magnesium 500 MG tablet Take  by mouth.    metFORMIN (GLUCOPHAGE) 500 MG tablet TAKE TWO TABLETS BY MOUTH TWICE A DAY WITH A MEAL    mirtazapine (REMERON) 30 MG tablet TAKE ONE TABLET BY MOUTH ONCE NIGHTLY    multivitamin with minerals tablet tablet Take 1 tablet by mouth Daily.    pramipexole (MIRAPEX) 1  "MG tablet Take 1 tablet by mouth 2 (Two) Times a Day.    ProAir  (90 Base) MCG/ACT inhaler Inhale 2 puffs Every 4 (Four) Hours As Needed for Wheezing.    vitamin E 100 UNIT capsule Take 1 capsule by mouth Daily.    [DISCONTINUED] guaiFENesin (Mucinex) 600 MG 12 hr tablet Take 2 tablets by mouth 2 (Two) Times a Day.    [DISCONTINUED] methocarbamol (ROBAXIN) 500 MG tablet Take 1 tablet by mouth 4 (Four) Times a Day.     No current facility-administered medications on file prior to visit.       Immunization History   Administered Date(s) Administered    COVID-19 (MODERNA) 1st,2nd,3rd Dose Monovalent 11/30/2021    COVID-19 (PFIZER) Purple Cap Monovalent 01/14/2021, 02/04/2021    Shingrix 07/14/2023       Review of Systems   Constitutional:  Negative for activity change, appetite change, chills, fatigue and fever.   HENT:  Negative for congestion, ear pain, rhinorrhea and sore throat.    Respiratory:  Negative for cough and shortness of breath.    Cardiovascular:  Negative for chest pain, palpitations and leg swelling.   Gastrointestinal:  Negative for abdominal pain, constipation, diarrhea, nausea and vomiting.   Musculoskeletal:  Negative for arthralgias and myalgias.   Neurological:  Negative for headache.        Objective     /89 (BP Location: Left arm, Patient Position: Sitting)   Pulse 96   Ht 170.2 cm (67\")   Wt (!) 146 kg (321 lb 12.8 oz)   BMI 50.40 kg/m²       Physical Exam  Vitals and nursing note reviewed.   Constitutional:       General: She is not in acute distress.     Appearance: Normal appearance.   Cardiovascular:      Rate and Rhythm: Normal rate and regular rhythm.      Heart sounds: Normal heart sounds. No murmur heard.  Pulmonary:      Effort: Pulmonary effort is normal.      Breath sounds: Normal breath sounds.   Abdominal:      Palpations: Abdomen is soft.      Tenderness: There is no abdominal tenderness.   Musculoskeletal:      Cervical back: Neck supple.      Right lower leg: " No edema.      Left lower leg: No edema.      Comments: LOW BACK WITH LIMITED ROM BUT NEGATIVE SLR.  TENDER LEFT PARASPINOUS MUSCLES IN THE AREA OF T-12 / L-1.     Lymphadenopathy:      Cervical: No cervical adenopathy.   Neurological:      General: No focal deficit present.      Mental Status: She is alert.      Cranial Nerves: No cranial nerve deficit.      Coordination: Coordination normal.      Gait: Gait normal.   Psychiatric:         Mood and Affect: Mood normal.         Behavior: Behavior normal.         Result Review :                             Assessment and Plan      Diagnoses and all orders for this visit:    1. Essential hypertension (Primary)  Assessment & Plan:  Hypertension is stable and controlled  Continue current treatment regimen.  Blood pressure will be reassessed in 6 months.      2. Contusion of lower back, subsequent encounter  Assessment & Plan:  CT REPORT REVIEWED.  PROBABLE CONTUSION OF THE PARASPINOUS MUSCLES.  WILL START AN NSAID AND ADVISE THE MUSCLE RELAXER AT HS.  BEGIN STRTCHES, HEAT AND COL PACKS.  [facility]OS[facility]ER REFERRAL TO [facility]      Orders:  -     methocarbamol (ROBAXIN) 500 MG tablet; Take 2 tablets by mouth At Night As Needed for Muscle Spasms.  Dispense: 40 tablet; Refill: 1  -     diclofenac (VOLTAREN) 75 MG EC tablet; Take 1 tablet by mouth 2 (Two) Times a Day for 60 days.  Dispense: 60 tablet; Refill: 1    3. Type 2 diabetes mellitus treated without insulin  Assessment & Plan:  Diabetes is stable.   Continue current treatment regimen.  Diabetes will be reassessed in 6 months  NOT AT GOAL BUT IMPROVED.  WILL RECHECK AT NEXT VISIT               Follow Up     Return in about 3 months (around 9/17/2024).    Patient was given instructions and counseling regarding her condition or for health maintenance advice. Please see specific information pulled into the AVS if appropriate.

## 2024-06-24 ENCOUNTER — TELEPHONE (OUTPATIENT)
Dept: FAMILY MEDICINE CLINIC | Age: 64
End: 2024-06-24
Payer: COMMERCIAL

## 2024-06-25 ENCOUNTER — TELEPHONE (OUTPATIENT)
Dept: FAMILY MEDICINE CLINIC | Age: 64
End: 2024-06-25
Payer: COMMERCIAL

## 2024-06-25 DIAGNOSIS — E11.9 TYPE 2 DIABETES MELLITUS TREATED WITHOUT INSULIN: ICD-10-CM

## 2024-06-25 NOTE — TELEPHONE ENCOUNTER
----- Message from Anh MORROW sent at 6/17/2024  2:45 PM EDT -----  Leighton Finch MD  [facility]    PLEASE CALL THIS PATIENT IN A WEEK TO SEE HOW HER BACK PAIN IS DOING

## 2024-07-31 RX ORDER — MIRTAZAPINE 30 MG/1
30 TABLET, FILM COATED ORAL NIGHTLY
Qty: 90 TABLET | Refills: 0 | Status: SHIPPED | OUTPATIENT
Start: 2024-07-31

## 2024-08-29 DIAGNOSIS — G25.81 RLS (RESTLESS LEGS SYNDROME): ICD-10-CM

## 2024-08-29 RX ORDER — PRAMIPEXOLE DIHYDROCHLORIDE 1 MG/1
1 TABLET ORAL 2 TIMES DAILY
Qty: 60 TABLET | Refills: 1 | Status: SHIPPED | OUTPATIENT
Start: 2024-08-29

## 2024-09-25 DIAGNOSIS — E11.9 TYPE 2 DIABETES MELLITUS TREATED WITHOUT INSULIN: ICD-10-CM

## 2024-10-18 ENCOUNTER — OFFICE VISIT (OUTPATIENT)
Dept: FAMILY MEDICINE CLINIC | Age: 64
End: 2024-10-18
Payer: COMMERCIAL

## 2024-10-18 VITALS
DIASTOLIC BLOOD PRESSURE: 84 MMHG | BODY MASS INDEX: 45.99 KG/M2 | SYSTOLIC BLOOD PRESSURE: 159 MMHG | HEART RATE: 80 BPM | OXYGEN SATURATION: 93 % | TEMPERATURE: 98 F | HEIGHT: 67 IN | WEIGHT: 293 LBS

## 2024-10-18 DIAGNOSIS — Z00.00 ANNUAL PHYSICAL EXAM: Primary | ICD-10-CM

## 2024-10-18 DIAGNOSIS — I10 ESSENTIAL HYPERTENSION: ICD-10-CM

## 2024-10-18 DIAGNOSIS — E11.9 TYPE 2 DIABETES MELLITUS TREATED WITHOUT INSULIN: ICD-10-CM

## 2024-10-18 DIAGNOSIS — R00.2 PALPITATIONS: ICD-10-CM

## 2024-10-18 DIAGNOSIS — Z12.31 ENCOUNTER FOR SCREENING MAMMOGRAM FOR BREAST CANCER: ICD-10-CM

## 2024-10-18 DIAGNOSIS — R06.83 SNORING: ICD-10-CM

## 2024-10-18 DIAGNOSIS — Z87.898 HISTORY OF SYNCOPE: ICD-10-CM

## 2024-10-18 PROBLEM — S30.0XXA CONTUSION OF LOWER BACK: Status: RESOLVED | Noted: 2024-06-17 | Resolved: 2024-10-18

## 2024-10-18 PROCEDURE — 99396 PREV VISIT EST AGE 40-64: CPT | Performed by: FAMILY MEDICINE

## 2024-10-18 PROCEDURE — 99214 OFFICE O/P EST MOD 30 MIN: CPT | Performed by: FAMILY MEDICINE

## 2024-10-18 NOTE — ASSESSMENT & PLAN NOTE
Hypertension is stable and controlled  Continue current treatment regimen.  Blood pressure will be reassessed in 6 months  NOT AT GOAL TODAY, SHE WILL START CHECKING AT HOME AND WE WILL REASSESS AT HER NEXT VISIT .

## 2024-10-18 NOTE — PROGRESS NOTES
Chief Complaint  Annual Exam (6 month follow up )    Subjective          Melany Maguire presents to [facility]  History of Present Illness  --ANNUAL EXAM, LAST EXAM WAS ONE YEAR AGO, DOES NOT SMOKE, COLONOSCOPY WAS IN 2017  --TOLERATING BLOOD PRESSURE MEDICATION WITHOUT APPARENT SIDE EFFECTS, NOT CHECKING BP AT HOME CURRENTLY  --HGA1C IS DOWN TO 9.9 % WITH CURRENT MEDS, DUE FOR A FOOT EXAM  --TWO EPISODES OF LOC DURING THE PAST FEW MONTHS, ALSO EPISODES OF PALPITATIONS AND WEAKNESS.  NEGATIVE W/U IN  THE ER, NEGATIVE ECHO AND HOLTER IN 2021.  FEELS OK TODAY.    --STILL SNORING, DID NOT SEE THE SLEEP SPECIALIST        Allergies   Allergen Reactions    Penicillins Hives        Health Maintenance Due   Topic Date Due    URINE MICROALBUMIN  Never done    TDAP/TD VACCINES (1 - Tdap) Never done    HEPATITIS C SCREENING  Never done    BMI FOLLOWUP  08/05/2022    DIABETIC EYE EXAM  02/01/2023    ZOSTER VACCINE (2 of 2) 09/08/2023    ANNUAL PHYSICAL  07/14/2024    DIABETIC FOOT EXAM  07/14/2024    HEMOGLOBIN A1C  11/13/2024        Current Outpatient Medications on File Prior to Visit   Medication Sig    albuterol (PROVENTIL) (2.5 MG/3ML) 0.083% nebulizer solution Take 2.5 mg by nebulization Every 4 (Four) Hours As Needed for Wheezing.    amLODIPine (NORVASC) 10 MG tablet Take 1 tablet by mouth Daily.    Bioflavonoid Products (Vitamin C Plus) 1000 MG tablet Take  by mouth.    Black Elderberry,Berry-Flower, 575 MG capsule Take 2 capsules by mouth Daily.    lisinopril-hydrochlorothiazide (PRINZIDE,ZESTORETIC) 20-25 MG per tablet Take 1 tablet by mouth 2 (Two) Times a Day. (Patient taking differently: Take 1 tablet by mouth Daily.)    loratadine (CLARITIN) 10 MG tablet Take 1 tablet by mouth Daily.    Magnesium 500 MG tablet Take  by mouth.    metFORMIN (GLUCOPHAGE) 500 MG tablet TAKE TWO TABLETS BY MOUTH TWICE A DAY WITH A MEAL    methocarbamol (ROBAXIN) 500 MG tablet Take 2 tablets by  "mouth At Night As Needed for Muscle Spasms.    mirtazapine (REMERON) 30 MG tablet TAKE ONE TABLET BY MOUTH ONCE NIGHTLY    multivitamin with minerals tablet tablet Take 1 tablet by mouth Daily.    NON FORMULARY Daily. SIMPLY BEETS    pramipexole (MIRAPEX) 1 MG tablet TAKE 1 TABLET BY MOUTH TWICE A DAY    vitamin E 100 UNIT capsule Take 1 capsule by mouth Daily.    [DISCONTINUED] ProAir  (90 Base) MCG/ACT inhaler Inhale 2 puffs Every 4 (Four) Hours As Needed for Wheezing. (Patient not taking: Reported on 10/18/2024)     No current facility-administered medications on file prior to visit.       Immunization History   Administered Date(s) Administered    COVID-19 (MODERNA) 1st,2nd,3rd Dose Monovalent 11/30/2021    COVID-19 (PFIZER) Purple Cap Monovalent 01/14/2021, 02/04/2021    Shingrix 07/14/2023       Review of Systems   Constitutional:  Positive for fatigue. Negative for activity change, appetite change, chills and fever.   HENT:  Negative for congestion, ear pain, hearing loss, rhinorrhea and sore throat.    Eyes:  Negative for blurred vision and discharge.   Respiratory:  Negative for cough and shortness of breath.    Cardiovascular:  Negative for chest pain, palpitations and leg swelling.   Gastrointestinal:  Negative for abdominal pain, constipation, diarrhea, nausea and vomiting.   Genitourinary:  Negative for dysuria and hematuria.   Musculoskeletal:  Negative for arthralgias and myalgias.   Neurological:  Negative for headache.   Psychiatric/Behavioral:  Negative for depressed mood.         Objective     /84 (BP Location: Right arm, Patient Position: Sitting, Cuff Size: Large Adult)   Pulse 80   Temp 98 °F (36.7 °C) (Oral)   Ht 170.2 cm (67\")   Wt (!) 146 kg (321 lb 9.6 oz)   SpO2 93%   BMI 50.37 kg/m²       Physical Exam    Result Review :                             Assessment and Plan      Diagnoses and all orders for this visit:    1. Annual physical exam (Primary)  Assessment & " Plan:  ADVICE GIVEN RE:  SEATBELT USE, ALCOHOL USE, HEALTHY DIET, ROUTINE EYE AND DENTAL EXAM, ROUTINE VACCINATIONS.    DECLINES FLU AND COVID VACCINES       2. Essential hypertension  Assessment & Plan:  Hypertension is stable and controlled  Continue current treatment regimen.  Blood pressure will be reassessed in 6 months  NOT AT GOAL TODAY, SHE WILL START CHECKING AT HOME AND WE WILL REASSESS AT HER NEXT VISIT .      3. Palpitations  -     Holter monitor - 48 hour  -     Ambulatory Referral to Cardiology    4. History of syncope  Assessment & Plan:  CT REPORT REVIEWED, WILL PLACE A HOLTER, SEND HER TO CARDIOLOGY AND OBTAIN AN EEG     Orders:  -     Holter monitor - 48 hour  -     Ambulatory Referral to Cardiology  -     EEG; Future    5. Snoring  -     Ambulatory Referral to Sleep Medicine    6. Type 2 diabetes mellitus treated without insulin  Assessment & Plan:  Diabetes is stable.   Continue current treatment regimen.  Diabetes will be reassessed in 6 months      7. Encounter for screening mammogram for breast cancer  -     Mammo Screening Digital Tomosynthesis Bilateral With CAD; Future        Class 3 Severe Obesity (BMI >=40). Obesity-related health conditions include the following: dyslipidemias. Obesity is unchanged. BMI is is above average; BMI management plan is completed. We discussed portion control and increasing exercise.           Follow Up     Return in about 6 months (around 4/18/2025).    Patient was given instructions and counseling regarding her condition or for health maintenance advice. Please see specific information pulled into the AVS if appropriate.

## 2024-10-18 NOTE — ASSESSMENT & PLAN NOTE
ADVICE GIVEN RE:  SEATBELT USE, ALCOHOL USE, HEALTHY DIET, ROUTINE EYE AND DENTAL EXAM, ROUTINE VACCINATIONS.    DECLINES FLU AND COVID VACCINES

## 2024-10-28 DIAGNOSIS — G25.81 RLS (RESTLESS LEGS SYNDROME): ICD-10-CM

## 2024-10-28 DIAGNOSIS — I10 ESSENTIAL HYPERTENSION: ICD-10-CM

## 2024-10-28 NOTE — TELEPHONE ENCOUNTER
Rx Refill Note  Requested Prescriptions     Pending Prescriptions Disp Refills    pramipexole (MIRAPEX) 1 MG tablet [Pharmacy Med Name: PRAMIPEXOLE 1 MG TABLET] 60 tablet 1     Sig: TAKE 1 TABLET BY MOUTH TWICE A DAY    mirtazapine (REMERON) 30 MG tablet [Pharmacy Med Name: MIRTAZAPINE 30 MG TABLET] 90 tablet 0     Sig: TAKE ONE TABLET BY MOUTH ONCE NIGHTLY    amLODIPine (NORVASC) 10 MG tablet [Pharmacy Med Name: amLODIPine BESYLATE 10 MG TAB] 90 tablet 1     Sig: TAKE 1 TABLET BY MOUTH DAILY      Last office visit with prescribing clinician: 10/18/2024   Last telemedicine visit with prescribing clinician: Visit date not found   Next office visit with prescribing clinician: 1/24/2025  Calcium-Channel Blockers Protocol Failed     Misa Ball LPN  10/28/24, 10:36 EDT

## 2024-10-29 RX ORDER — AMLODIPINE BESYLATE 10 MG/1
10 TABLET ORAL DAILY
Qty: 90 TABLET | Refills: 1 | Status: SHIPPED | OUTPATIENT
Start: 2024-10-29

## 2024-10-29 RX ORDER — PRAMIPEXOLE DIHYDROCHLORIDE 1 MG/1
1 TABLET ORAL 2 TIMES DAILY
Qty: 180 TABLET | Refills: 1 | Status: SHIPPED | OUTPATIENT
Start: 2024-10-29

## 2024-10-29 RX ORDER — MIRTAZAPINE 30 MG/1
30 TABLET, FILM COATED ORAL NIGHTLY
Qty: 90 TABLET | Refills: 1 | Status: SHIPPED | OUTPATIENT
Start: 2024-10-29

## 2024-11-04 ENCOUNTER — HOSPITAL ENCOUNTER (OUTPATIENT)
Dept: NEUROLOGY | Facility: HOSPITAL | Age: 64
Discharge: HOME | End: 2024-11-04
Admitting: FAMILY MEDICINE
Payer: COMMERCIAL

## 2024-11-04 DIAGNOSIS — Z87.898 HISTORY OF SYNCOPE: ICD-10-CM

## 2024-11-04 PROCEDURE — 95816 EEG AWAKE AND DROWSY: CPT

## 2024-11-05 ENCOUNTER — OFFICE VISIT (OUTPATIENT)
Dept: SLEEP MEDICINE | Facility: HOSPITAL | Age: 64
End: 2024-11-05
Payer: COMMERCIAL

## 2024-11-05 VITALS
BODY MASS INDEX: 45.99 KG/M2 | HEIGHT: 67 IN | OXYGEN SATURATION: 93 % | HEART RATE: 95 BPM | DIASTOLIC BLOOD PRESSURE: 92 MMHG | SYSTOLIC BLOOD PRESSURE: 151 MMHG | WEIGHT: 293 LBS

## 2024-11-05 DIAGNOSIS — R06.83 SNORING: ICD-10-CM

## 2024-11-05 DIAGNOSIS — I10 ESSENTIAL HYPERTENSION: ICD-10-CM

## 2024-11-05 DIAGNOSIS — R29.818 SUSPECTED SLEEP APNEA: Primary | ICD-10-CM

## 2024-11-05 DIAGNOSIS — R35.1 NOCTURIA: ICD-10-CM

## 2024-11-05 DIAGNOSIS — E11.9 TYPE 2 DIABETES MELLITUS TREATED WITHOUT INSULIN: ICD-10-CM

## 2024-11-05 DIAGNOSIS — G47.10 HYPERSOMNIA: ICD-10-CM

## 2024-11-05 DIAGNOSIS — G25.81 RLS (RESTLESS LEGS SYNDROME): ICD-10-CM

## 2024-11-05 DIAGNOSIS — R06.81 WITNESSED APNEIC SPELLS: ICD-10-CM

## 2024-11-05 DIAGNOSIS — E66.01 OBESITY, MORBID, BMI 40.0-49.9: ICD-10-CM

## 2024-11-05 PROCEDURE — G0463 HOSPITAL OUTPT CLINIC VISIT: HCPCS

## 2024-11-05 NOTE — PROGRESS NOTES
Sleep Consultation    Patient Name: Melany Maguire  Age/Sex: 64 y.o. female  : 1960  MRN: [id number]    Date of Encounter Visit: 2024  Encounter Provider: Lucille Gaffney MD  Referring Provider: Leighton Finch,*  Place of Service: [facility]  Patient Care Team:  Leighton Finch MD as PCP - General (Family Medicine)  Alec Phoenix MD (General Surgery)    Subjective:     Reason for Consult: Sleep apnea evaluation    History of Present Illness:  Melany Maguire is a 64 y.o. female is here for evaluation of CHLOE due to suggestive symptoms.    Patient complains of daytime fatigue and sleepiness with an [place] Sleepiness Scale (ESS) of 13.  Patient complains of witnessed apnea at night with snoring, waking up gasping for breath and waking up coughing  Waking up with a dry mouth  Frequent leg jerking at night and discomfort suggestive of restless leg syndrome causing difficulty falling asleep, doing better on the pramipexole  Patient has some vivid dream but no sleep paralysis or cataplexy to suggest narcolepsy  Problem falling asleep and staying asleep with mild nocturia  Frequent nightmare  Patient denies any parasomnias.  Denies any history of seizure disorder or recent head trauma.  Patient spends adequate amount of time in bed with no evidence of sleep restriction or improper sleep hygiene.  Bedtime is around 10-11 PM wake up time 8 AM, patient gets 4 to 5 hours of solid sleep in between, sleep onset can be variable and patient always wake up feeling tired  Caffeine intake is usually 3 caffeinated carbonated beverages per day, no illicit substance abuse, no smoking or excessive alcohol consumption  Comorbidities include: Hypertension, diabetes, restless leg on pramipexole    Review of Systems:   A twelve-system review was conducted and was negative except for the following: Fatigue, swollen ankles, shortness of breath fainting spells anxiety  and depression.        Past Medical History:  Past Medical History:   Diagnosis Date    Acute upper respiratory infection, unspecified     History of colonic polyps     Hypertension     Neuropathy     RLS (restless legs syndrome)     Secondary diabetes with peripheral neuropathy, uncontrolled     Type II diabetes mellitus     Venous insufficiency    Sister has CHLOE     Past Surgical History:   Procedure Laterality Date     SECTION      x2    CHOLECYSTECTOMY      COLONOSCOPY  2017    POLYPS    DILATATION AND CURETTAGE         Home Medications:     Current Outpatient Medications:     albuterol (PROVENTIL) (2.5 MG/3ML) 0.083% nebulizer solution, Take 2.5 mg by nebulization Every 4 (Four) Hours As Needed for Wheezing., Disp: 15 mL, Rfl: 12    amLODIPine (NORVASC) 10 MG tablet, TAKE 1 TABLET BY MOUTH DAILY, Disp: 90 tablet, Rfl: 1    Bioflavonoid Products (Vitamin C Plus) 1000 MG tablet, Take  by mouth., Disp: , Rfl:     Black Elderberry,Berry-Flower, 575 MG capsule, Take 2 capsules by mouth Daily., Disp: , Rfl:     lisinopril-hydrochlorothiazide (PRINZIDE,ZESTORETIC) 20-25 MG per tablet, Take 1 tablet by mouth 2 (Two) Times a Day. (Patient taking differently: Take 1 tablet by mouth Daily.), Disp: 180 tablet, Rfl: 1    Magnesium 500 MG tablet, Take  by mouth., Disp: , Rfl:     metFORMIN (GLUCOPHAGE) 500 MG tablet, TAKE TWO TABLETS BY MOUTH TWICE A DAY WITH A MEAL, Disp: 360 tablet, Rfl: 0    mirtazapine (REMERON) 30 MG tablet, TAKE ONE TABLET BY MOUTH ONCE NIGHTLY, Disp: 90 tablet, Rfl: 1    multivitamin with minerals tablet tablet, Take 1 tablet by mouth Daily., Disp: , Rfl:     NON FORMULARY, Daily. SIMPLY JA, Disp: , Rfl:     pramipexole (MIRAPEX) 1 MG tablet, Take 1 tablet by mouth 2 (Two) Times a Day., Disp: 180 tablet, Rfl: 1    vitamin E 100 UNIT capsule, Take 1 capsule by mouth Daily., Disp: , Rfl:     loratadine (CLARITIN) 10 MG tablet, Take 1 tablet by mouth Daily., Disp: , Rfl:     methocarbamol  "(ROBAXIN) 500 MG tablet, Take 2 tablets by mouth At Night As Needed for Muscle Spasms., Disp: 40 tablet, Rfl: 1    Allergies:  Allergies   Allergen Reactions    Penicillins Hives       Past Social History:  Social History     Socioeconomic History    Marital status:     Number of children: 2   Tobacco Use    Smoking status: Never    Smokeless tobacco: Never   Vaping Use    Vaping status: Never Used   Substance and Sexual Activity    Alcohol use: Never    Drug use: Defer     Comment: None    Sexual activity: Defer       Past Family History:  Family History   Problem Relation Age of Onset    Heart failure Mother     Diabetes type II Mother     Restless legs syndrome Mother     Coronary artery disease Father     Coronary artery disease Sister     Diabetes type II Sister     Restless legs syndrome Sister     Diabetes Other     Heart disease Other     Hypertension Other      No known family history of sleep apnea but positive family history of restless leg  Objective:        Vital Signs:   Visit Vitals  /92   Pulse 95   Ht 170.2 cm (67\")   Wt (!) 143 kg (314 lb 4.8 oz)   SpO2 93%   BMI 49.23 kg/m²     Wt Readings from Last 3 Encounters:   11/05/24 (!) 143 kg (314 lb 4.8 oz)   10/18/24 (!) 146 kg (321 lb 9.6 oz)   06/17/24 (!) 146 kg (321 lb 12.8 oz)     Neck Circumference: 16.5 inches    Physical Exam:   GEN:  No acute distress, alert, cooperative, well developed   EYES:   Sclerae clear. No icterus. PERRL. Normal EOM  ENT:   External ears/nose normal, no oral lesions, no thrush, mucous membranes moist, Septum midline. Mallampati IV airway.    NECK:  Supple, midline trachea, no JVD, short obese neck   LUNGS: Normal chest on inspection, CTAB, no wheezes. No rhonchi. No crackles. Respirations regular, even and unlabored.   CV:  Regular rhythm and rate. Normal S1/S2. No murmurs, gallops, or rubs noted.  ABD:  Soft, nontender and nondistended. Normal bowel sounds. No guarding  EXT:  Moves all extremities well. No " cyanosis. No redness. 2+ pitting edema.   Skin: Dry, intact, no bleeding      Diagnostic Data:  No prior sleep studies performed to review     Assessment and Plan:       ICD-10-CM ICD-9-CM   1. Suspected sleep apnea  R29.818 781.99   2. Hypersomnia  G47.10 780.54   3. Snoring  R06.83 786.09   4. Witnessed apneic spells  R06.81 786.03   5. Nocturia  R35.1 788.43   6. Obesity, morbid, BMI 40.0-49.9  E66.01 278.01   7. Essential hypertension  I10 401.9   8. Type 2 diabetes mellitus treated without insulin  E11.9 250.00   9. RLS (restless legs syndrome)  G25.81 333.94       Recommendations:     Patient is a good candidate for the home sleep study which will be ordered today  If the home sleep study is inconclusive or nondiagnostic, we will consider the in-lab sleep study  Patient is agreeable with the CPAP therapy and will be initiated accordingly      Patient was educated in depth about CHLOE and cardiovascular consequences if left untreated, including but not limited to CHF, CAD, arrhythmias, CVA, and/or HTN. Education also provided about the diagnostic tools for CHLOE, including the polysomnography and the treatment modalities, including the CPAP.     If patient has obstructive sleep apnea the recommend treatment is CPAP and will start CPAP and patient will follow up within 31-90 days after starting CPAP for compliance review.   Will address alternative treatment option if intolerant to CPAP     Adherence to the CPAP is a key factor in successful treatment of CHLOE and the patient was encouraged to contact us in case of problem with the CPAP or the mask that can limit the tolerance of the compliance with the therapy.    Patient was educated about the impact of obesity on sleep apnea and the benefit of weight loss and weight loss was recommended    Orders Placed This Encounter   Procedures    Home Sleep Study     No orders of the defined types were placed in this encounter.     Return in about 3 months (around  2/5/2025).    Lucille Gaffney MD   [facility]   11/05/24  15:25 EST    Dictated utilizing Dragon dictation

## 2024-12-04 DIAGNOSIS — G47.10 HYPERSOMNIA: ICD-10-CM

## 2024-12-04 DIAGNOSIS — R06.83 SNORING: ICD-10-CM

## 2024-12-04 DIAGNOSIS — R06.81 WITNESSED APNEIC SPELLS: ICD-10-CM

## 2024-12-04 DIAGNOSIS — R29.818 SUSPECTED SLEEP APNEA: Primary | ICD-10-CM

## 2024-12-04 RX ORDER — ZOLPIDEM TARTRATE 5 MG/1
5 TABLET ORAL NIGHTLY PRN
Qty: 2 TABLET | Refills: 0 | Status: SHIPPED | OUTPATIENT
Start: 2024-12-04

## 2024-12-10 ENCOUNTER — PATIENT ROUNDING (BHMG ONLY) (OUTPATIENT)
Dept: NEUROLOGY | Facility: CLINIC | Age: 64
End: 2024-12-10
Payer: COMMERCIAL

## 2024-12-10 ENCOUNTER — OFFICE VISIT (OUTPATIENT)
Dept: NEUROLOGY | Facility: CLINIC | Age: 64
End: 2024-12-10
Payer: COMMERCIAL

## 2024-12-10 VITALS
SYSTOLIC BLOOD PRESSURE: 128 MMHG | DIASTOLIC BLOOD PRESSURE: 82 MMHG | HEIGHT: 67 IN | WEIGHT: 293 LBS | HEART RATE: 110 BPM | BODY MASS INDEX: 45.99 KG/M2

## 2024-12-10 DIAGNOSIS — E11.42 DIABETIC POLYNEUROPATHY ASSOCIATED WITH TYPE 2 DIABETES MELLITUS: ICD-10-CM

## 2024-12-10 DIAGNOSIS — G25.81 RLS (RESTLESS LEGS SYNDROME): Primary | ICD-10-CM

## 2024-12-10 PROBLEM — E11.40 DIABETIC NEUROPATHY ASSOCIATED WITH TYPE 2 DIABETES MELLITUS: Status: ACTIVE | Noted: 2024-12-10

## 2024-12-10 PROCEDURE — 99203 OFFICE O/P NEW LOW 30 MIN: CPT | Performed by: PSYCHIATRY & NEUROLOGY

## 2024-12-10 RX ORDER — GABAPENTIN 100 MG/1
CAPSULE ORAL
Qty: 180 CAPSULE | Refills: 5 | Status: SHIPPED | OUTPATIENT
Start: 2024-12-10

## 2024-12-10 NOTE — PROGRESS NOTES
"Chief Complaint  Neurologic Problem (RLS) and Numbness (Numbness, tingling & burning sensation in both feet.)    Subjective          Melany Maguire is a 64 y.o. female who presents to [facility]  History of Present Illness  64-year-old woman followed for restless leg syndrome.  She says restless leg syndrome and neuropathy.  Her restless leg syndrome has been ongoing for the last 20 years.  She has been taking Mirapex 1 mg twice a day which has not helped her restless leg syndrome.  She has to get up and walk at least 4 times a week that gets worse.  She also has pins-and-needles burning, numbness and stabbing pains in her legs for the last 2 years from diabetic neuropathy.  She states that she has the urge to move her legs.  She also has apnea syndrome and being followed by sleep medicine.    She states that she had sleep disorder and 1 time she took gabapentin 100 mg 8 hours prior to having the event that she was hard to arouse.  She thinks that she was just so sleepy.  She is also taking Remeron at night.    Objective   Vital Signs:   /82   Pulse 110   Ht 170.2 cm (67.01\")   Wt (!) 141 kg (311 lb)   BMI 48.70 kg/m²     Physical Exam   Alert, fluent, phasic, follows commands well.  Optic disc are normal bilaterally's are full.  There is no weakness of the upper or lower extremities individual muscle testing.  Heart is regular and rhythm normal in rate.        Assessment and Plan  Diagnoses and all orders for this visit:    1. RLS (restless legs syndrome) (Primary)  Assessment & Plan:  Discussed with her that the main treatment for restless leg syndrome is gabapentin, pregabalin or long-acting form of gabapentin as the first choice.  She is willing to try it.  She will start gabapentin 100 mg nightly for 4 days and increase the dose by 100 mg every 4 days thereafter until her symptoms improve or she is taking up to 600 mg nightly.  This may also help her " peripheral neuropathy symptoms.  I discussed with her that the medication can cause her to become sleepy, unsteady at higher doses as well as leg swelling.  She is to call me for any problems.  Also again in 3 to 4 months time for follow-up thank for let me participate in her care.      2. Diabetic polyneuropathy associated with type 2 diabetes mellitus         Total time spent with the patient and coordinating patient care was 35 minutes.    Follow Up  No follow-ups on file.  Patient was given instructions and counseling regarding her condition or for health maintenance advice. Please see specific information pulled into the AVS if appropriate.

## 2024-12-10 NOTE — ASSESSMENT & PLAN NOTE
Discussed with her that the main treatment for restless leg syndrome is gabapentin, pregabalin or long-acting form of gabapentin as the first choice.  She is willing to try it.  She will start gabapentin 100 mg nightly for 4 days and increase the dose by 100 mg every 4 days thereafter until her symptoms improve or she is taking up to 600 mg nightly.  This may also help her peripheral neuropathy symptoms.  I discussed with her that the medication can cause her to become sleepy, unsteady at higher doses as well as leg swelling.  She is to call me for any problems.  Also again in 3 to 4 months time for follow-up thank for let me participate in her care.

## 2024-12-17 DIAGNOSIS — R29.818 SUSPECTED SLEEP APNEA: Primary | ICD-10-CM

## 2024-12-26 DIAGNOSIS — E11.9 TYPE 2 DIABETES MELLITUS TREATED WITHOUT INSULIN: ICD-10-CM

## 2024-12-26 NOTE — TELEPHONE ENCOUNTER
Rx Refill Note  Requested Prescriptions     Pending Prescriptions Disp Refills    metFORMIN (GLUCOPHAGE) 500 MG tablet [Pharmacy Med Name: METFORMIN  MG TABLET] 360 tablet 0     Sig: TAKE TWO TABLETS BY MOUTH TWICE A DAY WITH A MEAL      Last office visit with prescribing clinician: 10/18/2024   Last telemedicine visit with prescribing clinician: Visit date not found   Next office visit with prescribing clinician: 1/24/2025  Antihyperglycemics Protocol Failed     Dr Finch is out of the office until 12/30/24.  Sent to on call provider Dr Turner.     Misa Ball LPN  12/26/24, 09:51 EST

## 2025-01-07 ENCOUNTER — TELEPHONE (OUTPATIENT)
Dept: SLEEP MEDICINE | Facility: HOSPITAL | Age: 65
End: 2025-01-07
Payer: COMMERCIAL

## 2025-01-25 DIAGNOSIS — I10 ESSENTIAL HYPERTENSION: ICD-10-CM

## 2025-01-27 DIAGNOSIS — E11.9 TYPE 2 DIABETES MELLITUS TREATED WITHOUT INSULIN: ICD-10-CM

## 2025-01-27 RX ORDER — LISINOPRIL AND HYDROCHLOROTHIAZIDE 20; 25 MG/1; MG/1
1 TABLET ORAL 2 TIMES DAILY
Qty: 180 TABLET | Refills: 0 | Status: SHIPPED | OUTPATIENT
Start: 2025-01-27

## 2025-01-27 NOTE — TELEPHONE ENCOUNTER
Rx Refill Note  Requested Prescriptions     Pending Prescriptions Disp Refills    metFORMIN (GLUCOPHAGE) 500 MG tablet 120 tablet 0     Sig: Take 2 tablets by mouth 2 (Two) Times a Day With Meals.      Last office visit with prescribing clinician: 10/18/2024   Last telemedicine visit with prescribing clinician: Visit date not found   Next office visit with prescribing clinician: Visit date not found  COMPREHENSIVE METABOLIC PANEL (08/03/2024 18:34)     Misa Ball LPN  01/27/25, 16:52 EST

## 2025-02-04 ENCOUNTER — TELEPHONE (OUTPATIENT)
Dept: FAMILY MEDICINE CLINIC | Age: 65
End: 2025-02-04
Payer: COMMERCIAL

## 2025-02-04 NOTE — TELEPHONE ENCOUNTER
Left detailed message regarding overdue mammogram, including phone number for scheduling. Patient did not keep appointment on 1-13-25. 1st attempt

## 2025-04-23 DIAGNOSIS — G25.81 RLS (RESTLESS LEGS SYNDROME): ICD-10-CM

## 2025-04-23 RX ORDER — PRAMIPEXOLE DIHYDROCHLORIDE 1 MG/1
1 TABLET ORAL 2 TIMES DAILY
Qty: 180 TABLET | Refills: 1 | Status: SHIPPED | OUTPATIENT
Start: 2025-04-23

## 2025-04-23 NOTE — TELEPHONE ENCOUNTER
Rx Refill Note  Requested Prescriptions     Pending Prescriptions Disp Refills    pramipexole (MIRAPEX) 1 MG tablet [Pharmacy Med Name: PRAMIPEXOLE 1 MG TABLET] 180 tablet 1     Sig: TAKE 1 TABLET BY MOUTH 2 TIMES A DAY      Last office visit with prescribing clinician: 10/18/2024   Last telemedicine visit with prescribing clinician: Visit date not found   Next office visit with prescribing clinician: 5/2/2025  Antiparkinson Dopaminergic Meds Failed     Misa Ball LPN  04/23/25, 10:58 EDT

## 2025-04-25 DIAGNOSIS — I10 ESSENTIAL HYPERTENSION: ICD-10-CM

## 2025-04-25 RX ORDER — AMLODIPINE BESYLATE 10 MG/1
10 TABLET ORAL DAILY
Qty: 30 TABLET | Refills: 0 | Status: SHIPPED | OUTPATIENT
Start: 2025-04-25

## 2025-04-25 NOTE — TELEPHONE ENCOUNTER
Rx Refill Note  Requested Prescriptions     Pending Prescriptions Disp Refills    amLODIPine (NORVASC) 10 MG tablet 90 tablet 1     Sig: Take 1 tablet by mouth Daily.      Last office visit with prescribing clinician: 10/18/2024   Last telemedicine visit with prescribing clinician: Visit date not found   Next office visit with prescribing clinician: 5/2/2025  Last refill sent: 10/29/24, #90, 1 refills    Dr Finch is out of the office until 04/28/25, sent to on call provider Dr JHOAN Ward.     Misa Ball LPN  04/25/25, 14:55 EDT

## 2025-05-03 DIAGNOSIS — I10 ESSENTIAL HYPERTENSION: ICD-10-CM

## 2025-05-05 RX ORDER — LISINOPRIL AND HYDROCHLOROTHIAZIDE 20; 25 MG/1; MG/1
1 TABLET ORAL 2 TIMES DAILY
Qty: 180 TABLET | Refills: 0 | Status: SHIPPED | OUTPATIENT
Start: 2025-05-05

## 2025-05-07 RX ORDER — MIRTAZAPINE 30 MG/1
30 TABLET, FILM COATED ORAL NIGHTLY
Qty: 90 TABLET | Refills: 1 | Status: SHIPPED | OUTPATIENT
Start: 2025-05-07

## 2025-05-19 DIAGNOSIS — E11.9 TYPE 2 DIABETES MELLITUS TREATED WITHOUT INSULIN: ICD-10-CM

## 2025-05-19 NOTE — TELEPHONE ENCOUNTER
Rx Refill Note  Requested Prescriptions     Pending Prescriptions Disp Refills    metFORMIN (GLUCOPHAGE) 500 MG tablet [Pharmacy Med Name: METFORMIN  MG TABLET] 120 tablet 2     Sig: TAKE 2 TABLETS BY MOUTH 2 TIMES A DAY WITH A MEAL      Last office visit with prescribing clinician: 10/18/2024   Last telemedicine visit with prescribing clinician: Visit date not found   Next office visit with prescribing clinician: 6/23/2025  Antihyperglycemics Protocol Failed     Misa Ball LPN  05/19/25, 11:13 EDT

## 2025-06-06 DIAGNOSIS — I10 ESSENTIAL HYPERTENSION: ICD-10-CM

## 2025-06-06 RX ORDER — AMLODIPINE BESYLATE 10 MG/1
10 TABLET ORAL DAILY
Qty: 90 TABLET | Refills: 3 | Status: SHIPPED | OUTPATIENT
Start: 2025-06-06

## 2025-06-06 NOTE — TELEPHONE ENCOUNTER
Rx Refill Note  Requested Prescriptions     Pending Prescriptions Disp Refills    amLODIPine (NORVASC) 10 MG tablet [Pharmacy Med Name: amLODIPine BESYLATE 10MG TAB] 30 tablet 0     Sig: TAKE 1 TABLET BY MOUTH DAILY      Last office visit with prescribing clinician: 06/17/2024, Dr Finch  Last telemedicine visit with prescribing clinician: Visit date not found   Next office visit with prescribing clinician: 06/23/2025, Dr Finch  Calcium-Channel Blockers Protocol Failed     Misa Ball LPN  06/06/25, 15:21 EDT

## 2025-08-05 DIAGNOSIS — I10 ESSENTIAL HYPERTENSION: ICD-10-CM

## 2025-08-05 RX ORDER — LISINOPRIL AND HYDROCHLOROTHIAZIDE 20; 25 MG/1; MG/1
1 TABLET ORAL 2 TIMES DAILY
Qty: 180 TABLET | Refills: 0 | OUTPATIENT
Start: 2025-08-05

## 2025-08-25 DIAGNOSIS — E11.9 TYPE 2 DIABETES MELLITUS TREATED WITHOUT INSULIN: ICD-10-CM
